# Patient Record
Sex: FEMALE | Race: BLACK OR AFRICAN AMERICAN | Employment: FULL TIME | URBAN - METROPOLITAN AREA
[De-identification: names, ages, dates, MRNs, and addresses within clinical notes are randomized per-mention and may not be internally consistent; named-entity substitution may affect disease eponyms.]

---

## 2017-01-05 ENCOUNTER — ROUTINE PRENATAL (OUTPATIENT)
Dept: OBGYN CLINIC | Age: 32
End: 2017-01-05

## 2017-01-05 VITALS
DIASTOLIC BLOOD PRESSURE: 76 MMHG | SYSTOLIC BLOOD PRESSURE: 124 MMHG | WEIGHT: 249 LBS | BODY MASS INDEX: 41.48 KG/M2 | HEIGHT: 65 IN

## 2017-01-05 DIAGNOSIS — Z34.03 ENCOUNTER FOR SUPERVISION OF NORMAL FIRST PREGNANCY IN THIRD TRIMESTER: Primary | ICD-10-CM

## 2017-01-19 ENCOUNTER — ROUTINE PRENATAL (OUTPATIENT)
Dept: OBGYN CLINIC | Age: 32
End: 2017-01-19

## 2017-01-19 VITALS
WEIGHT: 251 LBS | HEIGHT: 65 IN | DIASTOLIC BLOOD PRESSURE: 80 MMHG | SYSTOLIC BLOOD PRESSURE: 134 MMHG | BODY MASS INDEX: 41.82 KG/M2

## 2017-01-19 DIAGNOSIS — Z01.419 ENCOUNTER FOR GYNECOLOGICAL EXAMINATION WITHOUT ABNORMAL FINDING: Primary | ICD-10-CM

## 2017-01-19 NOTE — PATIENT INSTRUCTIONS
Weeks 30 to 32 of Your Pregnancy: Care Instructions  Your Care Instructions    You have made it to the final months of your pregnancy. By now, your baby is really starting to look like a baby, with hair and plump skin. As you enter the final weeks of pregnancy, the reality of having a baby may start to set in. This is the time to settle on a name, get your household in order, set up a safe nursery, and find quality  if needed. Doing these things in advance will allow you to focus on caring for and enjoying your new baby. You may also want to have a tour of your hospital's labor and delivery unit to get a better idea of what to expect while you are in the hospital.  During these last months, it is very important to take good care of yourself and pay attention to what your body needs. If your doctor says it is okay for you to work, don't push yourself too hard. Use the tips provided in this care sheet to ease heartburn and care for varicose veins. If you haven't already had the Tdap shot during this pregnancy, talk to your doctor about getting it. It will help protect your  against pertussis infection. Follow-up care is a key part of your treatment and safety. Be sure to make and go to all appointments, and call your doctor if you are having problems. It's also a good idea to know your test results and keep a list of the medicines you take. How can you care for yourself at home? Pay attention to your baby's movements  · You should feel your baby move several times every day. · Your baby now turns less, and kicks and jabs more. · Your baby sleeps 20 to 45 minutes at a time and is more active at certain times of day. · If your doctor wants you to count your baby's kicks:  ¨ Empty your bladder, and lie on your side or relax in a comfortable chair. ¨ Write down your start time. ¨ Pay attention only to your baby's movements. Count any movement except hiccups.   ¨ After you have counted 10 movements, write down your stop time. ¨ Write down how many minutes it took for your baby to move 10 times. ¨ If an hour goes by and you have not recorded 10 movements, have something to eat or drink and then count for another hour. If you do not record 10 movements in either hour, call your doctor. Ease heartburn  · Eat small, frequent meals. · Do not eat chocolate, peppermint, or very spicy foods. Avoid drinks with caffeine, such as coffee, tea, and sodas. · Avoid bending over or lying down after meals. · Talk a short walk after you eat. · If heartburn is a problem at night, do not eat for 2 hours before bedtime. · Take antacids like Mylanta, Maalox, Rolaids, or Tums. Do not take antacids that have sodium bicarbonate. Care for varicose veins  · Varicose veins are blood vessels that stretch out with the extra blood during pregnancy. Your legs may ache or throb. Most varicose veins will go away after the birth. · Avoid standing for long periods of time. Sit with your legs crossed at the ankles, not the knees. · Sit with your feet propped up. · Avoid tight clothing or stockings. Wear support hose. · Exercise regularly. Try walking for at least 30 minutes a day. Where can you learn more? Go to http://michael-janelle.info/. Enter D648 in the search box to learn more about \"Weeks 30 to 32 of Your Pregnancy: Care Instructions. \"  Current as of: May 30, 2016  Content Version: 11.1  © 6060-2746 [employer]. Care instructions adapted under license by [employer] (which disclaims liability or warranty for this information). If you have questions about a medical condition or this instruction, always ask your healthcare professional. [place] [address] any warranty or liability for your use of this information.

## 2017-01-24 ENCOUNTER — TELEPHONE (OUTPATIENT)
Dept: OBGYN CLINIC | Age: 32
End: 2017-01-24

## 2017-01-24 NOTE — TELEPHONE ENCOUNTER
It is not unusual to start yenni this early especially in the evenings. If the contractions are every 5 minutes for an hour then would need to call and be seen. If the contractions taper off then she does not need to worry about them. As always, when she starts yenni she should increase her fluids and rest.  The fibroids may or may not impact the pregnancy. Sometimes they can cause more pain throughout the pregnancy. We can further discuss at her next appointment.

## 2017-01-24 NOTE — TELEPHONE ENCOUNTER
33year old patient 32w1d  pregnant patient last seen in the office on 17. Patient calling to complain of having regular contractions last night from 11pm to 1 am. Patient thinks they were every 10 minutes apart. Patient reports some not regular contractions off and on the rest of the night. Patient denies vaginal bleeding, ROM, and reports positive fetal movement. Patient reports a history of fibroids and is wondering how this will affect the latter part of the pregnancy. This nurse advised patient to increase fluids, rest and can take tylenol and to call back if symptoms change. Please advise    ANTOINETTE artis to speak to Annie Feng, who was also on the phone.

## 2017-02-02 ENCOUNTER — ROUTINE PRENATAL (OUTPATIENT)
Dept: OBGYN CLINIC | Age: 32
End: 2017-02-02

## 2017-02-02 VITALS
WEIGHT: 253 LBS | SYSTOLIC BLOOD PRESSURE: 130 MMHG | HEIGHT: 65 IN | DIASTOLIC BLOOD PRESSURE: 80 MMHG | BODY MASS INDEX: 42.15 KG/M2

## 2017-02-02 DIAGNOSIS — Z34.03 ENCOUNTER FOR SUPERVISION OF NORMAL FIRST PREGNANCY IN THIRD TRIMESTER: Primary | ICD-10-CM

## 2017-02-02 NOTE — PROGRESS NOTES
Pt doing well, see prenatal flowsheet. Physician review of ultrasound performed by technician    Today's ultrasound report and images were reviewed and discussed with the patient. Please see images and imaging report entered by technician in PACS for more detail and progress     A SINGLE VERTEX 33W3D IUP IS SEEN. FETAL CARDIAC MOTION OBSERVED. LIMITED ANATOMY WAS VISUALIZED AND APPEARS WNL. APPROPRIATE FETAL GROWTH IS SEEN. SIZE = DATES. ISRAEL AND PLACENTA APPEAR WNL.

## 2017-02-10 ENCOUNTER — ROUTINE PRENATAL (OUTPATIENT)
Dept: OBGYN CLINIC | Age: 32
End: 2017-02-10

## 2017-02-10 VITALS
DIASTOLIC BLOOD PRESSURE: 82 MMHG | WEIGHT: 254 LBS | BODY MASS INDEX: 42.32 KG/M2 | SYSTOLIC BLOOD PRESSURE: 130 MMHG | HEIGHT: 65 IN

## 2017-02-10 DIAGNOSIS — Z34.83 PRENATAL CARE, SUBSEQUENT PREGNANCY, THIRD TRIMESTER: Primary | ICD-10-CM

## 2017-02-10 DIAGNOSIS — Z34.03 ENCOUNTER FOR SUPERVISION OF NORMAL FIRST PREGNANCY IN THIRD TRIMESTER: ICD-10-CM

## 2017-02-10 LAB — GRBS, EXTERNAL: POSITIVE

## 2017-02-14 ENCOUNTER — ROUTINE PRENATAL (OUTPATIENT)
Dept: OBGYN CLINIC | Age: 32
End: 2017-02-14

## 2017-02-14 VITALS
WEIGHT: 255 LBS | HEIGHT: 65 IN | BODY MASS INDEX: 42.49 KG/M2 | DIASTOLIC BLOOD PRESSURE: 80 MMHG | SYSTOLIC BLOOD PRESSURE: 124 MMHG

## 2017-02-14 DIAGNOSIS — Z34.03 ENCOUNTER FOR SUPERVISION OF NORMAL FIRST PREGNANCY IN THIRD TRIMESTER: Primary | ICD-10-CM

## 2017-02-14 NOTE — PATIENT INSTRUCTIONS

## 2017-02-16 LAB
GP B STREP DNA SPEC QL NAA+PROBE: POSITIVE
ORGANISM IDENTIFICATION, 188761: NORMAL

## 2017-02-20 ENCOUNTER — ROUTINE PRENATAL (OUTPATIENT)
Dept: OBGYN CLINIC | Age: 32
End: 2017-02-20

## 2017-02-20 VITALS
SYSTOLIC BLOOD PRESSURE: 124 MMHG | DIASTOLIC BLOOD PRESSURE: 84 MMHG | WEIGHT: 256 LBS | BODY MASS INDEX: 42.65 KG/M2 | HEIGHT: 65 IN

## 2017-02-20 DIAGNOSIS — Z34.03 ENCOUNTER FOR SUPERVISION OF NORMAL FIRST PREGNANCY IN THIRD TRIMESTER: Primary | ICD-10-CM

## 2017-02-27 ENCOUNTER — ROUTINE PRENATAL (OUTPATIENT)
Dept: OBGYN CLINIC | Age: 32
End: 2017-02-27

## 2017-02-27 VITALS — WEIGHT: 260 LBS | BODY MASS INDEX: 43.27 KG/M2 | SYSTOLIC BLOOD PRESSURE: 120 MMHG | DIASTOLIC BLOOD PRESSURE: 74 MMHG

## 2017-02-27 DIAGNOSIS — Z34.03 ENCOUNTER FOR SUPERVISION OF NORMAL FIRST PREGNANCY IN THIRD TRIMESTER: Primary | ICD-10-CM

## 2017-02-27 NOTE — PATIENT INSTRUCTIONS
Preparing for Childbirth: Care Instructions  Your Care Instructions  You are getting close to the birth of your child. Even after these months of taking care of yourself and the baby, you can still take steps that will help you have a healthy labor and birth. You can take classes to help you and your partner or  prepare for the birth. You also can talk with your doctor about what you would like to happen during your labor. In the last 2 months of your pregnancy, your baby becomes too big to move around easily inside the uterus and may seem to move less. At the end of your pregnancy, your baby probably will settle into a head-down position. You will likely feel some pressure in your pelvis as you get close to the birth. You may notice moments when your belly tightens and becomes firm to the touch, then relaxes. These are called [facility] contractions, which sometimes occur as often as every 10 to 20 minutes. These contractions usually stop when you are active. (True labor pains continue or increase if you move around.)  Rupture of your membranes (\"breaking of the water\") often is a sign that labor has started or is about to start. This happens when a hole or tear develops in the fluid-filled bag (amniotic sac) that surrounds and protects your baby. You may feel a huge gush of water or a steady trickle of fluid. Call your doctor or go to the hospital if you think this has happened. Contractions may start, or if you are already having contractions, they may get stronger. Follow-up care is a key part of your treatment and safety. Be sure to make and go to all appointments, and call your doctor if you are having problems. Its also a good idea to know your test results and keep a list of the medicines you take. How can you care for yourself at home? · Get plenty of rest.  · Take childbirth classes with your partner or . You will learn relaxation exercises that are helpful during labor.  You also will learn what you can do to manage labor pain. · If you have other children, take a class to learn how to help them adjust to the new baby. · Develop a written birth plan, if you choose to, keeping in mind that labor is hard to predict and your plan may change after labor begins. Some of what a birth plan may address includes:  ¨ Where you would like to have your baby. This includes the building and the room. It could be the hospital's birthing room, a separate birthing center, or your own home. ¨ Who you would like to assist with delivery of your baby. You may want your doctor, an obstetrician, or a certified nurse-midwife. Some women prefer a lay midwife or a  to provide support before and after delivery. ¨ Whether you want a , nurse, midwife, or  to give you support from early labor until after childbirth. ¨ What comfort measures you want. You may have to choose between walking around during labor or having the security of a heart monitor for your baby. You may want to listen to music during labor. You may know what position you want to be in (such as sitting, squatting, or reclining) for pushing. ¨ What pain relief you would like. There are several choices, so be sure to talk with your doctor about them. ¨ How you want you and your baby to spend the first few hours after birth. ¨ You may want to keep your baby with you for at least 1 hour after birth for bonding and early breastfeeding. ¨ You may want the hospital to delay some infant care steps, such as a vitamin K injection, so that you have calming time with your baby. ¨ You may not want visitors, or you may want other family members there. · Know the early signs of labor, such as a steady ache low in your back. · If you are going to a hospital or clinic to have your baby, have your bag ready to go. ¨ Pack a nightgown, robe, panties, socks, and slippers.  You may want to bring your own soap, shampoo, brush, toothbrush, and toothpaste. Bring your own self-adhesive sanitary pads. ¨ In your baby's bag, bring an outfit, small blanket, and diapers. Have your car seat ready to go. When should you call for help? Call 911 anytime you think you may need emergency care. For example, call if:  · You passed out (lost consciousness). · You have severe vaginal bleeding. · You have severe pain in your belly or pelvis. · You have had fluid gushing or leaking from your vagina and you know or think the umbilical cord is bulging into your vagina. If this happens, immediately get down on your knees so your rear end (buttocks) is higher than your head. This will decrease the pressure on the cord until help arrives. Call your doctor now or seek immediate medical care if:  · You have signs of preeclampsia, such as:  ¨ Sudden swelling of your face, hands, or feet. ¨ New vision problems (such as dimness or blurring). ¨ A severe headache. · You have any vaginal bleeding. · You have belly pain or cramping. · You have a fever. · You have had regular contractions (with or without pain) for an hour. This means that you have 8 or more within 1 hour or 4 or more in 20 minutes after you change your position and drink fluids. · You have a sudden release of fluid from your vagina. · You have low back pain or pelvic pressure that does not go away. · You notice that your baby has stopped moving or is moving much less than normal.  Watch closely for changes in your health, and be sure to contact your doctor if you have any problems. Where can you learn more? Go to http://michael-janelle.info/. Enter C807 in the search box to learn more about \"Preparing for Childbirth: Care Instructions. \"  Current as of: May 30, 2016  Content Version: 11.1  © 0637-6167 [employer]. Care instructions adapted under license by [employer] (which disclaims liability or warranty for this information).  If you have questions about a medical condition or this instruction, always ask your healthcare professional. [place] [address] any warranty or liability for your use of this information.

## 2017-02-27 NOTE — MR AVS SNAPSHOT
Visit Information Date & Time Provider Department Dept. Phone Encounter #  
 2/27/2017  2:50 PM MD Mike García [id number] Your Appointments 2/27/2017  2:50 PM  
OB VISIT with MD Mike García ([address]) Appt Note: u/s (growth) then 1wk fob FW  
 [address] [facility] [address] [place] [address] [phone]  
[facility] [address] [facility] [address] Upcoming Health Maintenance Date Due  
 PAP AKA CERVICAL CYTOLOGY 4/13/2015 INFLUENZA AGE 9 TO ADULT 8/1/2016 Allergies as of 2/27/2017  Review Complete On: 2/20/2017 By: Kasandra Cano MD  
  
 Severity Noted Reaction Type Reactions Aspirin  02/07/2015    Hives Current Immunizations  Never Reviewed Name Date Tdap 12/20/2016 Not reviewed this visit Vitals BP  
  
  
  
  
  
 120/74 BMI and BSA Data Body Mass Index Body Surface Area  
 43.27 kg/m 2 2.33 m 2 Preferred Pharmacy Pharmacy Name Phone Ana [facility] [phone] Your Updated Medication List  
  
   
This list is accurate as of: 2/27/17  2:45 PM.  Always use your most recent med list.  
  
  
  
  
 albuterol 90 mcg/actuation inhaler Commonly known as:  PROVENTIL HFA, VENTOLIN HFA, PROAIR HFA Take 2 Puffs by inhalation every four (4) hours as needed for Wheezing. AMBULATORY BREAST PUMP Use for normal postpartum lactation  
  
 levoFLOXacin 500 mg tablet Commonly known as:  Phi Bailey Take 500 mg by mouth daily. Indications: sinus infection [facility] (28) 0.25-35 mg-mcg Tab Generic drug:  norgestimate-ethinyl estradiol Take 1 Tab by mouth daily. Patient Instructions Preparing for Childbirth: Care Instructions Your Care Instructions You are getting close to the birth of your child. Even after these months of taking care of yourself and the baby, you can still take steps that will help you have a healthy labor and birth. You can take classes to help you and your partner or  prepare for the birth. You also can talk with your doctor about what you would like to happen during your labor. In the last 2 months of your pregnancy, your baby becomes too big to move around easily inside the uterus and may seem to move less. At the end of your pregnancy, your baby probably will settle into a head-down position. You will likely feel some pressure in your pelvis as you get close to the birth. You may notice moments when your belly tightens and becomes firm to the touch, then relaxes. These are called [facility] contractions, which sometimes occur as often as every 10 to 20 minutes. These contractions usually stop when you are active. (True labor pains continue or increase if you move around.) Rupture of your membranes (\"breaking of the water\") often is a sign that labor has started or is about to start. This happens when a hole or tear develops in the fluid-filled bag (amniotic sac) that surrounds and protects your baby. You may feel a huge gush of water or a steady trickle of fluid. Call your doctor or go to the hospital if you think this has happened. Contractions may start, or if you are already having contractions, they may get stronger. Follow-up care is a key part of your treatment and safety. Be sure to make and go to all appointments, and call your doctor if you are having problems. Its also a good idea to know your test results and keep a list of the medicines you take. How can you care for yourself at home? · Get plenty of rest. 
· Take childbirth classes with your partner or . You will learn relaxation exercises that are helpful during labor. You also will learn what you can do to manage labor pain. · If you have other children, take a class to learn how to help them adjust to the new baby. · Develop a written birth plan, if you choose to, keeping in mind that labor is hard to predict and your plan may change after labor begins. Some of what a birth plan may address includes: ¨ Where you would like to have your baby. This includes the building and the room. It could be the hospital's birthing room, a separate birthing center, or your own home. ¨ Who you would like to assist with delivery of your baby. You may want your doctor, an obstetrician, or a certified nurse-midwife. Some women prefer a lay midwife or a  to provide support before and after delivery. ¨ Whether you want a , nurse, midwife, or  to give you support from early labor until after childbirth. ¨ What comfort measures you want. You may have to choose between walking around during labor or having the security of a heart monitor for your baby. You may want to listen to music during labor. You may know what position you want to be in (such as sitting, squatting, or reclining) for pushing. ¨ What pain relief you would like. There are several choices, so be sure to talk with your doctor about them. ¨ How you want you and your baby to spend the first few hours after birth. ¨ You may want to keep your baby with you for at least 1 hour after birth for bonding and early breastfeeding. ¨ You may want the hospital to delay some infant care steps, such as a vitamin K injection, so that you have calming time with your baby. ¨ You may not want visitors, or you may want other family members there. · Know the early signs of labor, such as a steady ache low in your back. · If you are going to a hospital or clinic to have your baby, have your bag ready to go. ¨ Pack a nightgown, robe, panties, socks, and slippers. You may want to bring your own soap, shampoo, brush, toothbrush, and toothpaste.  Bring your own self-adhesive sanitary pads. ¨ In your baby's bag, bring an outfit, small blanket, and diapers. Have your car seat ready to go. When should you call for help? Call 911 anytime you think you may need emergency care. For example, call if: 
· You passed out (lost consciousness). · You have severe vaginal bleeding. · You have severe pain in your belly or pelvis. · You have had fluid gushing or leaking from your vagina and you know or think the umbilical cord is bulging into your vagina. If this happens, immediately get down on your knees so your rear end (buttocks) is higher than your head. This will decrease the pressure on the cord until help arrives. Call your doctor now or seek immediate medical care if: 
· You have signs of preeclampsia, such as: 
¨ Sudden swelling of your face, hands, or feet. ¨ New vision problems (such as dimness or blurring). ¨ A severe headache. · You have any vaginal bleeding. · You have belly pain or cramping. · You have a fever. · You have had regular contractions (with or without pain) for an hour. This means that you have 8 or more within 1 hour or 4 or more in 20 minutes after you change your position and drink fluids. · You have a sudden release of fluid from your vagina. · You have low back pain or pelvic pressure that does not go away. · You notice that your baby has stopped moving or is moving much less than normal. 
Watch closely for changes in your health, and be sure to contact your doctor if you have any problems. Where can you learn more? Go to http://michael-janelle.info/. Enter C937 in the search box to learn more about \"Preparing for Childbirth: Care Instructions. \" Current as of: May 30, 2016 Content Version: 11.1 © 8658-8955 [employer]. Care instructions adapted under license by [employer] (which disclaims liability or warranty for this information).  If you have questions about a medical condition or this instruction, always ask your healthcare professional. [address] any warranty or liability for your use of this information. Introducing [facility] & HEALTH SERVICES! Dear Pam Gan: Thank you for requesting a [employer] account. Our records indicate that you already have an active [employer] account. You can access your account anytime at [email or web address] Did you know that you can access your hospital and ER discharge instructions at any time in [employer]? You can also review all of your test results from your hospital stay or ER visit. Additional Information If you have questions, please visit the Frequently Asked Questions section of the [employer] website at [email or web address] Remember, [employer] is NOT to be used for urgent needs. For medical emergencies, dial 911. Now available from your iPhone and Android! Please provide this summary of care documentation to your next provider. Your primary care clinician is listed as Eh Allen. If you have any questions after today's visit, please call [phone].

## 2017-03-01 ENCOUNTER — PATIENT MESSAGE (OUTPATIENT)
Dept: OBGYN CLINIC | Age: 32
End: 2017-03-01

## 2017-03-02 RX ORDER — VALACYCLOVIR HYDROCHLORIDE 1 G/1
1000 TABLET, FILM COATED ORAL DAILY
Qty: 30 TAB | Refills: 1 | Status: SHIPPED | OUTPATIENT
Start: 2017-03-02 | End: 2021-06-04

## 2017-03-06 ENCOUNTER — ROUTINE PRENATAL (OUTPATIENT)
Dept: OBGYN CLINIC | Age: 32
End: 2017-03-06

## 2017-03-06 VITALS — DIASTOLIC BLOOD PRESSURE: 80 MMHG | SYSTOLIC BLOOD PRESSURE: 124 MMHG | BODY MASS INDEX: 43.77 KG/M2 | WEIGHT: 263 LBS

## 2017-03-06 DIAGNOSIS — Z34.03 ENCOUNTER FOR SUPERVISION OF NORMAL FIRST PREGNANCY IN THIRD TRIMESTER: Primary | ICD-10-CM

## 2017-03-06 NOTE — MR AVS SNAPSHOT
Visit Information Date & Time Provider Department Dept. Phone Encounter #  
 3/6/2017  2:20 PM Madhu Vu MD Greig [facility] [phone] [id number] Upcoming Health Maintenance Date Due  
 PAP AKA CERVICAL CYTOLOGY 4/13/2015 INFLUENZA AGE 9 TO ADULT 8/1/2016 Allergies as of 3/6/2017  Review Complete On: 2/27/2017 By: Madhu Vu MD  
  
 Severity Noted Reaction Type Reactions Aspirin  02/07/2015    Hives Current Immunizations  Never Reviewed Name Date Tdap 12/20/2016 Not reviewed this visit Vitals BP Weight(growth percentile) LMP BMI OB Status Smoking Status 124/80 263 lb (119.3 kg) 06/13/2016 (Exact Date) 43.77 kg/m2 Pregnant Never Smoker BMI and BSA Data Body Mass Index Body Surface Area 43.77 kg/m 2 2.34 m 2 Preferred Pharmacy Pharmacy Name Phone Ana Laura[address] [phone] Your Updated Medication List  
  
   
This list is accurate as of: 3/6/17  2:32 PM.  Always use your most recent med list.  
  
  
  
  
 albuterol 90 mcg/actuation inhaler Commonly known as:  PROVENTIL HFA, VENTOLIN HFA, PROAIR HFA Take 2 Puffs by inhalation every four (4) hours as needed for Wheezing. AMBULATORY BREAST PUMP Use for normal postpartum lactation  
  
 levoFLOXacin 500 mg tablet Commonly known as:  Arlyss Benitez Take 500 mg by mouth daily. Indications: sinus infection [facility] (50) 0.25-35 mg-mcg Tab Generic drug:  norgestimate-ethinyl estradiol Take 1 Tab by mouth daily. valACYclovir 1 gram tablet Commonly known as:  VALTREX Take 1 Tab by mouth daily. Introducing [facility] & HEALTH SERVICES! Dear Wilbert Nicholson: Thank you for requesting a [employer] account. Our records indicate that you already have an active [employer] account.   You can access your account anytime at [email or web address] Did you know that you can access your hospital and ER discharge instructions at any time in [employer]? You can also review all of your test results from your hospital stay or ER visit. Additional Information If you have questions, please visit the Frequently Asked Questions section of the [employer] website at [email or web address] Remember, [employer] is NOT to be used for urgent needs. For medical emergencies, dial 911. Now available from your iPhone and Android! Please provide this summary of care documentation to your next provider. Your primary care clinician is listed as Dayna Leslie. If you have any questions after today's visit, please call [phone].

## 2017-03-10 ENCOUNTER — ROUTINE PRENATAL (OUTPATIENT)
Dept: OBGYN CLINIC | Age: 32
End: 2017-03-10

## 2017-03-10 VITALS — BODY MASS INDEX: 43.77 KG/M2 | WEIGHT: 263 LBS | SYSTOLIC BLOOD PRESSURE: 126 MMHG | DIASTOLIC BLOOD PRESSURE: 78 MMHG

## 2017-03-10 DIAGNOSIS — N89.8 VAGINAL DISCHARGE DURING PREGNANCY IN THIRD TRIMESTER: Primary | ICD-10-CM

## 2017-03-10 DIAGNOSIS — O26.893 VAGINAL DISCHARGE DURING PREGNANCY IN THIRD TRIMESTER: Primary | ICD-10-CM

## 2017-03-10 DIAGNOSIS — Z34.03 ENCOUNTER FOR SUPERVISION OF NORMAL FIRST PREGNANCY IN THIRD TRIMESTER: ICD-10-CM

## 2017-03-10 NOTE — MR AVS SNAPSHOT
Visit Information Date & Time Provider Department Dept. Phone Encounter #  
 3/10/2017  9:00 AM Kasandra Cano MD [employer] [phone] Your Appointments 3/13/2017  2:20 PM  
OB VISIT with Kasandra Cano MD  
[employer] ([facility]) Appt Note: 1wk fob  
 [address] [facility] [address] [facility] Gigi Alvarez [address]  
[facility] [address] [place] [address] Upcoming Health Maintenance Date Due  
 PAP AKA CERVICAL CYTOLOGY 4/13/2015 INFLUENZA AGE 9 TO ADULT 8/1/2016 Allergies as of 3/10/2017  Review Complete On: 3/10/2017 By: Nestor Benjamin Severity Noted Reaction Type Reactions Aspirin  02/07/2015    Hives Current Immunizations  Never Reviewed Name Date Tdap 12/20/2016 Not reviewed this visit Vitals BP Weight(growth percentile) LMP BMI OB Status Smoking Status 126/78 263 lb (119.3 kg) 06/13/2016 (Exact Date) 43.77 kg/m2 Pregnant Never Smoker BMI and BSA Data Body Mass Index Body Surface Area 43.77 kg/m 2 2.34 m 2 Preferred Pharmacy Pharmacy Name Phone Lynda Gardner [phone] Your Updated Medication List  
  
   
This list is accurate as of: 3/10/17  9:24 AM.  Always use your most recent med list.  
  
  
  
  
 albuterol 90 mcg/actuation inhaler Commonly known as:  PROVENTIL HFA, VENTOLIN HFA, PROAIR HFA Take 2 Puffs by inhalation every four (4) hours as needed for Wheezing. AMBULATORY BREAST PUMP Use for normal postpartum lactation  
  
 levoFLOXacin 500 mg tablet Commonly known as:  Phi Bailey Take 500 mg by mouth daily. Indications: sinus infection [address] [facility] (28) 0.25-35 mg-mcg Tab Generic drug:  norgestimate-ethinyl estradiol Take 1 Tab by mouth daily. valACYclovir 1 gram tablet Commonly known as:  VALTREX Take 1 Tab by mouth daily. Introducing [facility]! Dear Garry Jaimes: Thank you for requesting a [employer] account. Our records indicate that you already have an active [employer] account. You can access your account anytime at [email or web address] Did you know that you can access your hospital and ER discharge instructions at any time in [employer]? You can also review all of your test results from your hospital stay or ER visit. Additional Information If you have questions, please visit the Frequently Asked Questions section of the [employer] website at [email or web address] Remember, [employer] is NOT to be used for urgent needs. For medical emergencies, dial 911. Now available from your iPhone and Android! Please provide this summary of care documentation to your next provider. Your primary care clinician is listed as Jigar Larkin. If you have any questions after today's visit, please call [phone].

## 2017-03-10 NOTE — PROGRESS NOTES
C/O cramping that was constant and now is coming and going. Cervix still closed. Feeling good fetal movement. No evidence of active labor. Routine precautions discussed. Also reports vaginal discharge.  + Discharge on exam.  Cervix 20/cl/-3/V. Will send nuswab.

## 2017-03-14 ENCOUNTER — ANESTHESIA (OUTPATIENT)
Dept: SURGERY | Age: 32
End: 2017-03-14
Payer: COMMERCIAL

## 2017-03-14 ENCOUNTER — HOSPITAL ENCOUNTER (INPATIENT)
Age: 32
LOS: 3 days | Discharge: HOME | End: 2017-03-17
Attending: OBSTETRICS & GYNECOLOGY | Admitting: OBSTETRICS & GYNECOLOGY
Payer: COMMERCIAL

## 2017-03-14 ENCOUNTER — ANESTHESIA EVENT (OUTPATIENT)
Dept: SURGERY | Age: 32
End: 2017-03-14
Payer: COMMERCIAL

## 2017-03-14 PROBLEM — Z34.90 PREGNANCY: Status: ACTIVE | Noted: 2017-03-14

## 2017-03-14 LAB
A VAGINAE DNA VAG QL NAA+PROBE: NORMAL SCORE
ALBUMIN SERPL BCP-MCNC: 2.5 G/DL (ref 3.5–5)
ALBUMIN/GLOB SERPL: 0.7 {RATIO} (ref 1.1–2.2)
ALP SERPL-CCNC: 96 U/L (ref 45–117)
ALT SERPL-CCNC: 21 U/L (ref 12–78)
ANION GAP BLD CALC-SCNC: 12 MMOL/L (ref 5–15)
AST SERPL W P-5'-P-CCNC: 15 U/L (ref 15–37)
BASOPHILS # BLD AUTO: 0 K/UL (ref 0–0.1)
BASOPHILS # BLD: 0 % (ref 0–1)
BILIRUB SERPL-MCNC: 0.3 MG/DL (ref 0.2–1)
BUN SERPL-MCNC: 4 MG/DL (ref 6–20)
BUN/CREAT SERPL: 9 (ref 12–20)
BVAB2 DNA VAG QL NAA+PROBE: NORMAL SCORE
C ALBICANS DNA VAG QL NAA+PROBE: NEGATIVE
C GLABRATA DNA VAG QL NAA+PROBE: NEGATIVE
CALCIUM SERPL-MCNC: 8.9 MG/DL (ref 8.5–10.1)
CHLORIDE SERPL-SCNC: 104 MMOL/L (ref 97–108)
CO2 SERPL-SCNC: 23 MMOL/L (ref 21–32)
CREAT SERPL-MCNC: 0.47 MG/DL (ref 0.55–1.02)
CREAT UR-MCNC: 44.69 MG/DL
EOSINOPHIL # BLD: 0.1 K/UL (ref 0–0.4)
EOSINOPHIL NFR BLD: 1 % (ref 0–7)
ERYTHROCYTE [DISTWIDTH] IN BLOOD BY AUTOMATED COUNT: 15.6 % (ref 11.5–14.5)
GLOBULIN SER CALC-MCNC: 3.8 G/DL (ref 2–4)
GLUCOSE SERPL-MCNC: 107 MG/DL (ref 65–100)
HCT VFR BLD AUTO: 37.7 % (ref 35–47)
HGB BLD-MCNC: 11.8 G/DL (ref 11.5–16)
LYMPHOCYTES # BLD AUTO: 13 % (ref 12–49)
LYMPHOCYTES # BLD: 1.4 K/UL (ref 0.8–3.5)
MCH RBC QN AUTO: 27.3 PG (ref 26–34)
MCHC RBC AUTO-ENTMCNC: 31.3 G/DL (ref 30–36.5)
MCV RBC AUTO: 87.1 FL (ref 80–99)
MEGA1 DNA VAG QL NAA+PROBE: NORMAL SCORE
MONOCYTES # BLD: 0.5 K/UL (ref 0–1)
MONOCYTES NFR BLD AUTO: 5 % (ref 5–13)
NEUTS SEG # BLD: 8.5 K/UL (ref 1.8–8)
NEUTS SEG NFR BLD AUTO: 81 % (ref 32–75)
PLATELET # BLD AUTO: 216 K/UL (ref 150–400)
POTASSIUM SERPL-SCNC: 3.8 MMOL/L (ref 3.5–5.1)
PROT SERPL-MCNC: 6.3 G/DL (ref 6.4–8.2)
PROT UR-MCNC: 13 MG/DL (ref 0–11.9)
PROT/CREAT UR-RTO: 0.3
RBC # BLD AUTO: 4.33 M/UL (ref 3.8–5.2)
SODIUM SERPL-SCNC: 139 MMOL/L (ref 136–145)
WBC # BLD AUTO: 10.4 K/UL (ref 3.6–11)

## 2017-03-14 PROCEDURE — 75410000002 HC LABOR FEE PER 1 HR: Performed by: OBSTETRICS & GYNECOLOGY

## 2017-03-14 PROCEDURE — 74011250636 HC RX REV CODE- 250/636: Performed by: ANESTHESIOLOGY

## 2017-03-14 PROCEDURE — 59025 FETAL NON-STRESS TEST: CPT | Performed by: OBSTETRICS & GYNECOLOGY

## 2017-03-14 PROCEDURE — 80053 COMPREHEN METABOLIC PANEL: CPT | Performed by: OBSTETRICS & GYNECOLOGY

## 2017-03-14 PROCEDURE — 74011250636 HC RX REV CODE- 250/636

## 2017-03-14 PROCEDURE — 36415 COLL VENOUS BLD VENIPUNCTURE: CPT | Performed by: OBSTETRICS & GYNECOLOGY

## 2017-03-14 PROCEDURE — 96360 HYDRATION IV INFUSION INIT: CPT | Performed by: OBSTETRICS & GYNECOLOGY

## 2017-03-14 PROCEDURE — 99284 EMERGENCY DEPT VISIT MOD MDM: CPT | Performed by: OBSTETRICS & GYNECOLOGY

## 2017-03-14 PROCEDURE — 74011000250 HC RX REV CODE- 250: Performed by: OBSTETRICS & GYNECOLOGY

## 2017-03-14 PROCEDURE — 74011000250 HC RX REV CODE- 250

## 2017-03-14 PROCEDURE — 59200 INSERT CERVICAL DILATOR: CPT | Performed by: OBSTETRICS & GYNECOLOGY

## 2017-03-14 PROCEDURE — 84156 ASSAY OF PROTEIN URINE: CPT | Performed by: OBSTETRICS & GYNECOLOGY

## 2017-03-14 PROCEDURE — 74011250637 HC RX REV CODE- 250/637: Performed by: OBSTETRICS & GYNECOLOGY

## 2017-03-14 PROCEDURE — 96361 HYDRATE IV INFUSION ADD-ON: CPT | Performed by: OBSTETRICS & GYNECOLOGY

## 2017-03-14 PROCEDURE — 74011250636 HC RX REV CODE- 250/636: Performed by: OBSTETRICS & GYNECOLOGY

## 2017-03-14 PROCEDURE — 65270000029 HC RM PRIVATE

## 2017-03-14 PROCEDURE — 74011000250 HC RX REV CODE- 250: Performed by: ANESTHESIOLOGY

## 2017-03-14 PROCEDURE — 3E0R3CZ INTRODUCE REGIONAL ANESTH IN SPINAL CANAL, PERC: ICD-10-PCS | Performed by: ANESTHESIOLOGY

## 2017-03-14 PROCEDURE — 74011000258 HC RX REV CODE- 258: Performed by: OBSTETRICS & GYNECOLOGY

## 2017-03-14 PROCEDURE — 77010026065 HC OXYGEN MINIMUM MEDICAL AIR: Performed by: OBSTETRICS & GYNECOLOGY

## 2017-03-14 PROCEDURE — 00HU33Z INSERTION OF INFUSION DEVICE INTO SPINAL CANAL, PERCUTANEOUS APPROACH: ICD-10-PCS | Performed by: ANESTHESIOLOGY

## 2017-03-14 PROCEDURE — 85025 COMPLETE CBC W/AUTO DIFF WBC: CPT | Performed by: OBSTETRICS & GYNECOLOGY

## 2017-03-14 PROCEDURE — 99218 HC RM OBSERVATION: CPT

## 2017-03-14 PROCEDURE — 77030014125 HC TY EPDRL BBMI -B: Performed by: ANESTHESIOLOGY

## 2017-03-14 RX ORDER — LIDOCAINE HYDROCHLORIDE AND EPINEPHRINE 20; 5 MG/ML; UG/ML
INJECTION, SOLUTION EPIDURAL; INFILTRATION; INTRACAUDAL; PERINEURAL AS NEEDED
Status: DISCONTINUED | OUTPATIENT
Start: 2017-03-14 | End: 2017-03-15 | Stop reason: HOSPADM

## 2017-03-14 RX ORDER — OXYTOCIN IN 5 % DEXTROSE 30/500 ML
1-25 PLASTIC BAG, INJECTION (ML) INTRAVENOUS
Status: DISCONTINUED | OUTPATIENT
Start: 2017-03-15 | End: 2017-03-14

## 2017-03-14 RX ORDER — SODIUM CHLORIDE 0.9 % (FLUSH) 0.9 %
5-10 SYRINGE (ML) INJECTION EVERY 8 HOURS
Status: DISCONTINUED | OUTPATIENT
Start: 2017-03-14 | End: 2017-03-15

## 2017-03-14 RX ORDER — BUTORPHANOL TARTRATE 2 MG/ML
2 INJECTION INTRAMUSCULAR; INTRAVENOUS ONCE
Status: COMPLETED | OUTPATIENT
Start: 2017-03-14 | End: 2017-03-14

## 2017-03-14 RX ORDER — OXYTOCIN IN 5 % DEXTROSE 30/500 ML
1-25 PLASTIC BAG, INJECTION (ML) INTRAVENOUS
Status: DISCONTINUED | OUTPATIENT
Start: 2017-03-15 | End: 2017-03-15

## 2017-03-14 RX ORDER — ACETAMINOPHEN 325 MG/1
650 TABLET ORAL
Status: DISCONTINUED | OUTPATIENT
Start: 2017-03-14 | End: 2017-03-15

## 2017-03-14 RX ORDER — FENTANYL/BUPIVACAINE/NS/PF 2-1250MCG
1-16 PREFILLED PUMP RESERVOIR EPIDURAL CONTINUOUS
Status: DISCONTINUED | OUTPATIENT
Start: 2017-03-14 | End: 2017-03-15 | Stop reason: HOSPADM

## 2017-03-14 RX ORDER — TERBUTALINE SULFATE 1 MG/ML
INJECTION SUBCUTANEOUS
Status: COMPLETED
Start: 2017-03-14 | End: 2017-03-14

## 2017-03-14 RX ORDER — SODIUM CHLORIDE 0.9 % (FLUSH) 0.9 %
5-10 SYRINGE (ML) INJECTION AS NEEDED
Status: DISCONTINUED | OUTPATIENT
Start: 2017-03-14 | End: 2017-03-15

## 2017-03-14 RX ORDER — SODIUM CHLORIDE, SODIUM LACTATE, POTASSIUM CHLORIDE, CALCIUM CHLORIDE 600; 310; 30; 20 MG/100ML; MG/100ML; MG/100ML; MG/100ML
125 INJECTION, SOLUTION INTRAVENOUS CONTINUOUS
Status: DISCONTINUED | OUTPATIENT
Start: 2017-03-14 | End: 2017-03-15

## 2017-03-14 RX ORDER — TERBUTALINE SULFATE 1 MG/ML
0.25 INJECTION SUBCUTANEOUS
Status: COMPLETED | OUTPATIENT
Start: 2017-03-14 | End: 2017-03-14

## 2017-03-14 RX ORDER — ZOLPIDEM TARTRATE 5 MG/1
5 TABLET ORAL
Status: DISCONTINUED | OUTPATIENT
Start: 2017-03-14 | End: 2017-03-15

## 2017-03-14 RX ADMIN — TERBUTALINE SULFATE 0.25 MG: 1 INJECTION SUBCUTANEOUS at 21:44

## 2017-03-14 RX ADMIN — SODIUM CHLORIDE, SODIUM LACTATE, POTASSIUM CHLORIDE, AND CALCIUM CHLORIDE 999 ML/HR: 600; 310; 30; 20 INJECTION, SOLUTION INTRAVENOUS at 06:17

## 2017-03-14 RX ADMIN — EPHEDRINE SULFATE 10 MG: 50 INJECTION INTRAMUSCULAR; INTRAVENOUS; SUBCUTANEOUS at 21:22

## 2017-03-14 RX ADMIN — BUTORPHANOL TARTRATE 2 MG: 2 INJECTION, SOLUTION INTRAMUSCULAR; INTRAVENOUS at 17:35

## 2017-03-14 RX ADMIN — EPHEDRINE SULFATE 10 MG: 50 INJECTION INTRAMUSCULAR; INTRAVENOUS; SUBCUTANEOUS at 21:49

## 2017-03-14 RX ADMIN — SODIUM CHLORIDE, SODIUM LACTATE, POTASSIUM CHLORIDE, AND CALCIUM CHLORIDE 1000 ML: 600; 310; 30; 20 INJECTION, SOLUTION INTRAVENOUS at 05:01

## 2017-03-14 RX ADMIN — EPHEDRINE SULFATE 10 MG: 50 INJECTION INTRAMUSCULAR; INTRAVENOUS; SUBCUTANEOUS at 21:33

## 2017-03-14 RX ADMIN — SODIUM CHLORIDE, SODIUM LACTATE, POTASSIUM CHLORIDE, AND CALCIUM CHLORIDE 125 ML/HR: 600; 310; 30; 20 INJECTION, SOLUTION INTRAVENOUS at 17:38

## 2017-03-14 RX ADMIN — FENTANYL 0.2 MG/100ML-BUPIV 0.125%-NACL 0.9% EPIDURAL INJ 10 ML/HR: 2/0.125 SOLUTION at 23:23

## 2017-03-14 RX ADMIN — LIDOCAINE HYDROCHLORIDE AND EPINEPHRINE 10 ML: 20; 5 INJECTION, SOLUTION EPIDURAL; INFILTRATION; INTRACAUDAL; PERINEURAL at 21:12

## 2017-03-14 RX ADMIN — ACETAMINOPHEN 650 MG: 325 TABLET ORAL at 15:35

## 2017-03-14 RX ADMIN — PROMETHAZINE HYDROCHLORIDE 12.5 MG: 25 INJECTION INTRAMUSCULAR; INTRAVENOUS at 17:35

## 2017-03-14 RX ADMIN — SODIUM CHLORIDE, SODIUM LACTATE, POTASSIUM CHLORIDE, AND CALCIUM CHLORIDE 999 ML/HR: 600; 310; 30; 20 INJECTION, SOLUTION INTRAVENOUS at 21:52

## 2017-03-14 RX ADMIN — DINOPROSTONE 10 MG: 10 INSERT VAGINAL at 11:23

## 2017-03-14 NOTE — PROGRESS NOTES
Ante Partum Progress Note    Manuela Liu  39w1d        Pt presented complaining of painful uterine contractions. No h/a, blurred vision, sob, cp. Vitals:  Visit Vitals    /67    Pulse 96    Temp 98.2 °F (36.8 °C)    Resp 16    Ht 5' 5\" (1.651 m)    Wt 263 lb (119.3 kg)    LMP 2016 (Exact Date)    SpO2 99%    BMI 43.77 kg/m2     Temp (24hrs), Av.2 °F (36.8 °C), Min:98.2 °F (36.8 °C), Max:98.2 °F (36.8 °C)      Non stress test:  Moderate variability, 2 minute deceleration with spontaneous return to baseline, + accels    Uterine Activity: Irregular     Exam:  Patient without distress. Abdomen, fundus soft non-tender     Extremities, no redness or tenderness               Additional Exam: Patient without distress, Abdomen soft, non-tender, Dilation: 0 cm, Effacement: Long  Not Checked and Station: -3    Labs:     Lab Results   Component Value Date/Time    WBC 11.0 2016 02:09 PM    WBC 7.9 2016 09:12 AM    HGB 11.5 2016 02:09 PM    HGB 12.5 2016 09:12 AM    HCT 35.0 2016 02:09 PM    HCT 39.1 2016 09:12 AM    PLATELET 319  02:09 PM    PLATELET 732  09:12 AM    Hgb, External 11.5 2016    Hgb, External 12.5 2016    Hct, External 35.0 2016    Hct, External 39.1 2016    Platelet cnt., External 258 2016    Platelet cnt., External 287 2016       No results found for this or any previous visit (from the past 24 hour(s)). Assessment/Plan: 39w1d   No evidence of active labor, however pt has had 4 out 5 BP's that were elevated and a nonreactive NST at 39 weeks. Will check PIH labs and induce for GHTN at 39 weeks. Discussed risk of c/s due to unfavorable cervix. Will start with cervidil for cervical ripening.

## 2017-03-14 NOTE — PROGRESS NOTES
03/14/17 11:59 AM  CM met with patient to complete initial assessment and to begin discharge planning. Patient demographics reviewed and confirmed. 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 Patient's /FOB Campos Henderson [phone]) works, but will not have any time away from work. There is a support system of family and friends, including patient's mother Matt Henderson [phone]). Patient plans to breastfeed and has a pump to use. [facility] will provide medical follow up for the baby. Patient has car seat, crib, clothing, and other necessary supplies. Patient denied need for [facility] and Medicaid services. Care Management Interventions  PCP Verified by CM:  Yes  Transition of Care Consult (CM Consult): Discharge Planning  Current Support Network: Lives with Spouse  Confirm Follow Up Transport: Family  Plan discussed with Pt/Family/Caregiver: Yes  Discharge Location  Discharge Placement: [address] [place] [address]

## 2017-03-14 NOTE — H&P
History & Physical    Name: Salazar Junior MRN: [id number]  SSN: [id number]    YOB: 1985  Age: 33 y.o. Sex: female        Subjective:     Estimated Date of Delivery: 3/20/17  OB History      Para Term  AB TAB SAB Ectopic Multiple Living    2    1 1              Ms. Shanice Whipple is admitted with pregnancy at 39w1d for uterine contractins. Prenatal course was normal. Please see prenatal records for details. Past Medical History:   Diagnosis Date    Abnormal Papanicolaou smear of cervix 2004    Asthma     has not used inhaler years    Bronchitis     DIONI III (cervical intraepithelial neoplasia grade III) with severe dysplasia     Cold sore     history of oral cold sores     Past Surgical History:   Procedure Laterality Date    HX GYN       HX LEEP PROCEDURE   DIONI III     Social History     Occupational History    Not on file. Social History Main Topics    Smoking status: Never Smoker    Smokeless tobacco: Never Used    Alcohol use No    Drug use: No    Sexual activity: Yes     Partners: Male     Birth control/ protection: Condom     Family History   Problem Relation Age of Onset    No Known Problems Mother        Allergies   Allergen Reactions    Aspirin Hives     Prior to Admission medications    Medication Sig Start Date End Date Taking? Authorizing Provider   PNV no.24-iron-folic acid-dha (PRENATAL DHA+COMPLETE PRENATAL) [id number] mg-mcg-mg cmpk Take  by mouth. Yes Historical Provider   valACYclovir (VALTREX) 1 gram tablet Take 1 Tab by mouth daily. 3/2/17  Yes Jerzy Anguiano MD   AMBULATORY BREAST PUMP Use for normal postpartum lactation 10/18/16   Jerzy Anguiano MD   levofloxacin (LEVAQUIN) 500 mg tablet Take 500 mg by mouth daily. Indications: sinus infection    Miguelangel Paz MD   albuterol (PROVENTIL HFA, VENTOLIN HFA, PROAIR HFA) 90 mcg/actuation inhaler Take 2 Puffs by inhalation every four (4) hours as needed for Wheezing.     Miguelangel Paz MD norgestimate-ethinyl estradiol ([address] [facility], ,) 0.25-35 mg-mcg per tablet Take 1 Tab by mouth daily. Miguelangel Paz MD        Review of Systems: A comprehensive review of systems was negative except for that written in the HPI. Objective:     Vitals:  Vitals:    03/14/17 0603 03/14/17 0608 03/14/17 0621 03/14/17 0626   BP:       Pulse:       Resp:       Temp:       SpO2: 98% 96% 99% 99%   Weight:       Height:            Physical Exam:  Heart: Regular rate and rhythm  Lung: clear to auscultation throughout lung fields, no wheezes, no rales, no rhonchi and normal respiratory effort  Abdomen: soft, nontender  Cervical Exam: 1/30/-2  Lower Extremities:  - Edema No  Membranes:  Intact  Fetal Heart Rate: Baseline: 1303-140s per minute    Prenatal Labs:   Lab Results   Component Value Date/Time    Rubella, External Immune 07/25/2016    GrBStrep, External Positive 02/10/2017    HBsAg, External Negative 07/25/2016    HIV, External Negative 07/25/2016    RPR, External Negative 07/25/2016    Gonorrhea, External Negative 07/25/2016    Chlamydia, External Negative 07/25/2016        Assessment/Plan:     Plan: Admit for observation. IV fluids. Group B Strep was positive, use of prophylactic antibiotics not indicated.     Signed By:  Paul Farfan MD     March 14, 2017

## 2017-03-14 NOTE — IP AVS SNAPSHOT
Marialuisa Morel 
 
 
 [address] [facility] 
[phone] Patient: Vivian West MRN: [id number] SJQ:[birth year] You are allergic to the following Allergen Reactions Aspirin Hives Recent Documentation Height Weight Breastfeeding? BMI OB Status Smoking Status 1.651 m 119.3 kg Unknown 43.77 kg/m2 Recent pregnancy Never Smoker Unresulted Labs Order Current Status SAMPLE TO BLOOD BANK In process Emergency Contacts Name Discharge Info Relation Home Work Mobile Dwayne Robles DISCHARGE CAREGIVER [3] Spouse [3] [phone] Annabella Weiss  Parent [1] [phone] About your hospitalization You were admitted on:  March 14, 2017 You last received care in the:  [facility] You were discharged on:  March 17, 2017 Unit phone number:  [phone] Why you were hospitalized Your primary diagnosis was:  Not on File Your diagnoses also included:  Pregnancy Providers Seen During Your Hospitalizations Provider Role Specialty Primary office phone Jak Jain MD Attending Provider Obstetrics & Gynecology [phone] Your Primary Care Physician (PCP) Primary Care Physician Office Phone Office Fax Dairl Breath [phone] Follow-up Information Follow up With Details Comments Contact Info Erika Alaniz MD   [address] [place] [address],[address] [facility] [phone]. [phone] Jak Jain MD In 6 weeks  [address] [facility] [address] [facility] 
[phone] Current Discharge Medication List  
  
START taking these medications Dose & Instructions Dispensing Information Comments Morning Noon Evening Bedtime  
 oxyCODONE-acetaminophen 5-325 mg per tablet Commonly known as:  PERCOCET Your last dose was: Your next dose is: Dose:  1 Tab Take 1 Tab by mouth every four (4) hours as needed. Max Daily Amount: 6 Tabs. Quantity:  30 Tab Refills:  0 CONTINUE these medications which have NOT CHANGED Dose & Instructions Dispensing Information Comments Morning Noon Evening Bedtime PRENATAL DHA+COMPLETE PRENATAL -300 mg-mcg-mg Cmpk Generic drug:  PNV no.24-iron-folic acid-dha Your last dose was: Your next dose is: Take  by mouth. Refills:  0 ASK your doctor about these medications Dose & Instructions Dispensing Information Comments Morning Noon Evening Bedtime  
 albuterol 90 mcg/actuation inhaler Commonly known as:  PROVENTIL HFA, VENTOLIN HFA, PROAIR HFA Your last dose was: Your next dose is:    
   
   
 Dose:  2 Puff Take 2 Puffs by inhalation every four (4) hours as needed for Wheezing. Refills:  0 AMBULATORY BREAST PUMP Your last dose was: Your next dose is:    
   
   
 Use for normal postpartum lactation Quantity:  1 Device Refills:  0  
     
   
   
   
  
 levoFLOXacin 500 mg tablet Commonly known as:  Kenard Adrian Your last dose was: Your next dose is:    
   
   
 Dose:  500 mg Take 500 mg by mouth daily. Indications: sinus infection Refills:  0 [facility] [facility] (28) 0.25-35 mg-mcg Tab Generic drug:  norgestimate-ethinyl estradiol Your last dose was: Your next dose is:    
   
   
 Dose:  1 Tab Take 1 Tab by mouth daily. Refills:  0  
     
   
   
   
  
 valACYclovir 1 gram tablet Commonly known as:  VALTREX Your last dose was: Your next dose is:    
   
   
 Dose:  1000 mg Take 1 Tab by mouth daily. Quantity:  30 Tab Refills:  1 Where to Get Your Medications Information on where to get these meds will be given to you by the nurse or doctor. ! Ask your nurse or doctor about these medications  
  oxyCODONE-acetaminophen 5-325 mg per tablet Discharge Instructions POST DELIVERY DISCHARGE INSTRUCTIONS Name: Celestino Kirkland YOB: 1985 Primary Diagnosis: Active Problems: 
  Pregnancy (3/14/2017) General:  
 
Diet/Diet Restrictions: 
Eight 8-ounce glasses of fluid daily (water, juices); avoid excessive caffeine intake. Meals/snacks as desired which are high in fiber and carbohydrates and low in fat and cholesterol. Physical Activity / Restrictions / Safety:  
 
Avoid heavy lifting, no more that 8 lbs. For 2-3 weeks; No driving while taking narcotic pain medication. Post  patients should not drive until pain free. No intercourse 4-6 weeks, no douching or tampon use. May resume exercise in 6 weeks. Discharge Instructions/Special Treatment/Home Care Needs:  
 
Continue prenatal vitamins. Continue to use squirt bottle with warm water on your episiotomy after each bathroom use until bleeding stops. If steri-strips applied to your incision, remove in 7 days. Take stool softeners daily. Call your doctor for the following:  
 
Fever over 101 degrees by mouth. Vaginal bleeding heavier than a normal menstrual period or lost larger than a golf ball. Red streaks or increased swelling of legs, painful red streaks on your breast. 
Painful urination, or increased pain, redness or discharge with your incision. Pain Management:  
 
Pain Management:  
Take Acetaminophen (Tylenol) or Ibuprofen (Advil, Motrin), as directed for pain. Use a warm Sitz bath 3 times daily to relieve episiotomy or hemorrhoidal discomfort. Heating pad to  incision as needed. For hemorrhoidal discomfort, use Tucks and Anusol cream as needed and directed. Follow-Up Care:  
 
Appointment with MD: Follow-up Appointments Procedures  FOLLOW UP VISIT Appointment in: 6 Weeks Standing Status:   Standing Number of Occurrences:   1 Order Specific Question:   Appointment in Answer:   6 Weeks Telephone number: [phone] Signed By: Rolando Rodríguez MD                                                                                                   Date: 3/15/2017 Time: 8:41 AM 
 
 
Discharge Orders None authorGENharQuestetra Announcement We are excited to announce that we are making your provider's discharge notes available to you in [employer]. You will see these notes when they are completed and signed by the physician that discharged you from your recent hospital stay. If you have any questions or concerns about any information you see in [employer], please call the Health Information Department where you were seen or reach out to your Primary Care Provider for more information about your plan of care. Introducing [facility]! Dear Keegan Landa: Thank you for requesting a [employer] account. Our records indicate that you already have an active [employer] account. You can access your account anytime at [email or web address] Did you know that you can access your hospital and ER discharge instructions at any time in [employer]? You can also review all of your test results from your hospital stay or ER visit. Additional Information If you have questions, please visit the Frequently Asked Questions section of the [employer] website at [email or web address] Remember, [employer] is NOT to be used for urgent needs. For medical emergencies, dial 911. Now available from your iPhone and Android! General Information Please provide this summary of care documentation to your next provider. Patient Signature:  ____________________________________________________________ Date:  ____________________________________________________________  
  
[facility]  Provider Signature: ____________________________________________________________ Date:  ____________________________________________________________

## 2017-03-14 NOTE — PROGRESS NOTES
0345:  Patient arrived to unit with complaints of contractions starting around 0130. She denies vaginal bleeding and leaking of fluid but states she had a small amount of discharge shortly after she began yenni. Patient reports not drinking much water today. RN placed patient on EFM and triage flowsheet completed. Water pitcher filled for patient and juice given to increase fetal reactivity. Patient reports normal fetal activity. 0400:  SVE by Flaca Marroquin RN closed. 2979:  RN speaking with Rosalinda Caldwell MD regarding above information and FHT. MD states to place a PIV and give 1 L bolus. TORB. RN can hear audible movement on ultrasound monitor. 2778:  RN speaking with Rosalinda Caldwell MD regarding FHT. MD states to run LR at 125 mL/hr and the on call OB for PHUONG will round on her after change of shift.  TORB.    7914:  RN speaking with Rosalinda Caldwell MD regarding FHT and deceleration. MD aware and no new orders received at this time. 1152: Bedside and Verbal shift change report given to AG Linda (oncoming nurse) by ESVIN Jackson RN (offgoing nurse). Report included the following information SBAR, Kardex, Intake/Output, MAR, Accordion and Recent Results.

## 2017-03-14 NOTE — PROGRESS NOTES
1120: Cervidil inserted. Orders received to keep patient on monitor for 2 hours post cervidil insertion and then do NST q shift. Remove cervidil ~ 2315, and start pitocin @ midnight. Will monitor. 1654: Patient requesting pain medicine, would like to try stadol for now and wait on epidural. Dr. Angela Panchal called, TORB received, 2mg stadol and phergan now. Will monitor. 1800: Cervidil removed at this time, due to FHR decelerations and patient yenni regularly. Will monitor. 1900: Bedside and Verbal shift change report given to Corazon Haas RN (oncoming nurse) by Jennifer Restrepo RN (offgoing nurse). Report included the following information SBAR, Kardex and Recent Results.

## 2017-03-14 NOTE — PROGRESS NOTES
1900 Assumed care of pt at this time from offgoing nurse, Geneva Dyson notified of pt's current status, increased pain level, and FHT. Per MD she will come to bedside shortly. 2020 Dr. Brenda Dyson at bedside, discussing pain control options, including epidural being the best option at this time. Pt agreeable. 2155 Dr. Shaheed Curry at bedside, aware of third dose of ephedrine given per Brenda Dyson MD.  2126 Dr. Brenda Dyson requested at bedside due to [facility]. Remains at bedside until 2205 when [facility] and pt's BP's have stabilized. 65 Per MD, continue with current plan, continue to monitor BPs. Hold off on starting epidural infusion until pt starts to complain of feeling contractions(Dr. Shaheed Curry aware and agreeable) due to hypotension. Will continue to monitor. Will also hold off on starting pitocin until further notice from MD.   Sandi Dyson requested at bedside due to fetal tracing. 200 FSE applied by MD. SVE = 1cm.  2325 Dr. Shaheed Curry notified RN was going to start epidural infusion due to pt's complaint of contraction pain. Order to run epidural at 10mL/hr instead of Olindakrista Dyson to bedside for NRFHT, SVE= unchanged. POC discussed with pt.   0144 C/s called by MD at this time. [facility] regarding pt's allergy to aspirin and whether or not it is contraindicated with Toradol. Per pharmacist, since pt claims to have no reaction to Motrin, she should not have any issue with Toradol. Mike Garg from Dr. Shaheed Curry for Toradol. MD made aware of RN's call to pharmacy regarding allergy to aspirin.

## 2017-03-14 NOTE — PROGRESS NOTES
Labor Progress Note  Patient seen, fetal heart rate and contraction pattern evaluated, patient examined. Patient feeling uncomfortable with contractions; fetal tracing with decelerations secondary to uterine tachysyst. Cervidil pulled. Physical Exam:  Cervical Exam: FT/40%effaced    Membranes:  Intact  Uterine Activity: Frequency: now every 4 minutes  Fetal Heart Rate: Category 1-II  Station -3    Assessment/Plan:  Reassuring fetal status,give stadol/phenergan x 1; ~6hrs reassess and begin pitocin. Titrate pitocin up to 8 and maintain at this level for 12 hrs (use as ripening agent). Monitor fetal tracing and  labor progress closely.    Discussed with pt and family, agree

## 2017-03-15 ENCOUNTER — SURGERY (OUTPATIENT)
Age: 32
End: 2017-03-15

## 2017-03-15 PROCEDURE — 74011250636 HC RX REV CODE- 250/636

## 2017-03-15 PROCEDURE — 75410000002 HC LABOR FEE PER 1 HR: Performed by: OBSTETRICS & GYNECOLOGY

## 2017-03-15 PROCEDURE — 74011250636 HC RX REV CODE- 250/636: Performed by: ANESTHESIOLOGY

## 2017-03-15 PROCEDURE — 10907ZC DRAINAGE OF AMNIOTIC FLUID, THERAPEUTIC FROM PRODUCTS OF CONCEPTION, VIA NATURAL OR ARTIFICIAL OPENING: ICD-10-PCS | Performed by: OBSTETRICS & GYNECOLOGY

## 2017-03-15 PROCEDURE — 76010000391 HC C SECN FIRST 1 HR: Performed by: OBSTETRICS & GYNECOLOGY

## 2017-03-15 PROCEDURE — 74011250637 HC RX REV CODE- 250/637: Performed by: OBSTETRICS & GYNECOLOGY

## 2017-03-15 PROCEDURE — 88307 TISSUE EXAM BY PATHOLOGIST: CPT | Performed by: OBSTETRICS & GYNECOLOGY

## 2017-03-15 PROCEDURE — 74011250636 HC RX REV CODE- 250/636: Performed by: OBSTETRICS & GYNECOLOGY

## 2017-03-15 PROCEDURE — 76060000078 HC EPIDURAL ANESTHESIA: Performed by: OBSTETRICS & GYNECOLOGY

## 2017-03-15 PROCEDURE — 74011000258 HC RX REV CODE- 258: Performed by: OBSTETRICS & GYNECOLOGY

## 2017-03-15 PROCEDURE — 75410000003 HC RECOV DEL/VAG/CSECN EA 0.5 HR: Performed by: OBSTETRICS & GYNECOLOGY

## 2017-03-15 PROCEDURE — 77030034850

## 2017-03-15 PROCEDURE — 65270000029 HC RM PRIVATE

## 2017-03-15 PROCEDURE — 77030036554

## 2017-03-15 RX ORDER — OXYTOCIN/RINGER'S LACTATE 20/1000 ML
125-500 PLASTIC BAG, INJECTION (ML) INTRAVENOUS ONCE
Status: ACTIVE | OUTPATIENT
Start: 2017-03-15 | End: 2017-03-15

## 2017-03-15 RX ORDER — IBUPROFEN 800 MG/1
800 TABLET ORAL EVERY 8 HOURS
Status: DISCONTINUED | OUTPATIENT
Start: 2017-03-16 | End: 2017-03-17 | Stop reason: HOSPADM

## 2017-03-15 RX ORDER — SIMETHICONE 80 MG
80 TABLET,CHEWABLE ORAL AS NEEDED
Status: DISCONTINUED | OUTPATIENT
Start: 2017-03-15 | End: 2017-03-17 | Stop reason: HOSPADM

## 2017-03-15 RX ORDER — IBUPROFEN 800 MG/1
800 TABLET ORAL EVERY 8 HOURS
Status: DISCONTINUED | OUTPATIENT
Start: 2017-03-15 | End: 2017-03-15

## 2017-03-15 RX ORDER — SODIUM CHLORIDE 0.9 % (FLUSH) 0.9 %
5-10 SYRINGE (ML) INJECTION EVERY 8 HOURS
Status: DISCONTINUED | OUTPATIENT
Start: 2017-03-15 | End: 2017-03-16

## 2017-03-15 RX ORDER — CEFAZOLIN SODIUM IN 0.9 % NACL 2 G/50 ML
INTRAVENOUS SOLUTION, PIGGYBACK (ML) INTRAVENOUS
Status: COMPLETED
Start: 2017-03-15 | End: 2017-03-15

## 2017-03-15 RX ORDER — ZOLPIDEM TARTRATE 5 MG/1
5 TABLET ORAL
Status: DISCONTINUED | OUTPATIENT
Start: 2017-03-15 | End: 2017-03-17 | Stop reason: HOSPADM

## 2017-03-15 RX ORDER — OXYCODONE AND ACETAMINOPHEN 5; 325 MG/1; MG/1
1 TABLET ORAL
Status: DISCONTINUED | OUTPATIENT
Start: 2017-03-15 | End: 2017-03-17 | Stop reason: HOSPADM

## 2017-03-15 RX ORDER — OXYTOCIN 10 [USP'U]/ML
INJECTION, SOLUTION INTRAMUSCULAR; INTRAVENOUS AS NEEDED
Status: DISCONTINUED | OUTPATIENT
Start: 2017-03-15 | End: 2017-03-15 | Stop reason: HOSPADM

## 2017-03-15 RX ORDER — SODIUM CHLORIDE 0.9 % (FLUSH) 0.9 %
5-10 SYRINGE (ML) INJECTION AS NEEDED
Status: DISCONTINUED | OUTPATIENT
Start: 2017-03-15 | End: 2017-03-17 | Stop reason: HOSPADM

## 2017-03-15 RX ORDER — CEFAZOLIN SODIUM IN 0.9 % NACL 2 G/50 ML
2 INTRAVENOUS SOLUTION, PIGGYBACK (ML) INTRAVENOUS ONCE
Status: COMPLETED | OUTPATIENT
Start: 2017-03-15 | End: 2017-03-15

## 2017-03-15 RX ORDER — SODIUM CHLORIDE, SODIUM LACTATE, POTASSIUM CHLORIDE, CALCIUM CHLORIDE 600; 310; 30; 20 MG/100ML; MG/100ML; MG/100ML; MG/100ML
125 INJECTION, SOLUTION INTRAVENOUS CONTINUOUS
Status: DISCONTINUED | OUTPATIENT
Start: 2017-03-15 | End: 2017-03-17 | Stop reason: HOSPADM

## 2017-03-15 RX ORDER — OXYTOCIN IN 5 % DEXTROSE 30/500 ML
1-25 PLASTIC BAG, INJECTION (ML) INTRAVENOUS
Status: DISCONTINUED | OUTPATIENT
Start: 2017-03-16 | End: 2017-03-17 | Stop reason: HOSPADM

## 2017-03-15 RX ORDER — KETOROLAC TROMETHAMINE 30 MG/ML
30 INJECTION, SOLUTION INTRAMUSCULAR; INTRAVENOUS EVERY 6 HOURS
Status: COMPLETED | OUTPATIENT
Start: 2017-03-15 | End: 2017-03-15

## 2017-03-15 RX ORDER — ONDANSETRON 2 MG/ML
INJECTION INTRAMUSCULAR; INTRAVENOUS AS NEEDED
Status: DISCONTINUED | OUTPATIENT
Start: 2017-03-15 | End: 2017-03-15 | Stop reason: HOSPADM

## 2017-03-15 RX ORDER — MORPHINE SULFATE 0.5 MG/ML
INJECTION, SOLUTION EPIDURAL; INTRATHECAL; INTRAVENOUS AS NEEDED
Status: DISCONTINUED | OUTPATIENT
Start: 2017-03-15 | End: 2017-03-15 | Stop reason: HOSPADM

## 2017-03-15 RX ORDER — NALOXONE HYDROCHLORIDE 0.4 MG/ML
0.4 INJECTION, SOLUTION INTRAMUSCULAR; INTRAVENOUS; SUBCUTANEOUS AS NEEDED
Status: DISCONTINUED | OUTPATIENT
Start: 2017-03-15 | End: 2017-03-17 | Stop reason: HOSPADM

## 2017-03-15 RX ORDER — OXYCODONE AND ACETAMINOPHEN 5; 325 MG/1; MG/1
1 TABLET ORAL
Qty: 30 TAB | Refills: 0 | Status: SHIPPED | OUTPATIENT
Start: 2017-03-15 | End: 2021-06-04

## 2017-03-15 RX ADMIN — SODIUM CHLORIDE, SODIUM LACTATE, POTASSIUM CHLORIDE, AND CALCIUM CHLORIDE 125 ML/HR: 600; 310; 30; 20 INJECTION, SOLUTION INTRAVENOUS at 11:27

## 2017-03-15 RX ADMIN — KETOROLAC TROMETHAMINE 30 MG: 30 INJECTION, SOLUTION INTRAMUSCULAR at 04:37

## 2017-03-15 RX ADMIN — ONDANSETRON 4 MG: 2 INJECTION INTRAMUSCULAR; INTRAVENOUS at 02:43

## 2017-03-15 RX ADMIN — KETOROLAC TROMETHAMINE 30 MG: 30 INJECTION, SOLUTION INTRAMUSCULAR at 17:27

## 2017-03-15 RX ADMIN — MORPHINE SULFATE 3 MG: 0.5 INJECTION, SOLUTION EPIDURAL; INTRATHECAL; INTRAVENOUS at 02:34

## 2017-03-15 RX ADMIN — PENICILLIN G POTASSIUM 5 MILLION UNITS: 5000000 INJECTION, POWDER, FOR SOLUTION INTRAMUSCULAR; INTRAVENOUS at 00:15

## 2017-03-15 RX ADMIN — LIDOCAINE HYDROCHLORIDE AND EPINEPHRINE 10 ML: 20; 5 INJECTION, SOLUTION EPIDURAL; INFILTRATION; INTRACAUDAL; PERINEURAL at 01:57

## 2017-03-15 RX ADMIN — Medication 10 ML: at 23:07

## 2017-03-15 RX ADMIN — OXYCODONE HYDROCHLORIDE AND ACETAMINOPHEN 1 TABLET: 5; 325 TABLET ORAL at 19:42

## 2017-03-15 RX ADMIN — KETOROLAC TROMETHAMINE 30 MG: 30 INJECTION, SOLUTION INTRAMUSCULAR at 23:07

## 2017-03-15 RX ADMIN — CEFAZOLIN 2 G: 1 INJECTION, POWDER, FOR SOLUTION INTRAMUSCULAR; INTRAVENOUS; PARENTERAL at 02:10

## 2017-03-15 RX ADMIN — CEFAZOLIN 2 G: 1 INJECTION, POWDER, FOR SOLUTION INTRAMUSCULAR; INTRAVENOUS; PARENTERAL at 01:51

## 2017-03-15 RX ADMIN — SODIUM CHLORIDE, SODIUM LACTATE, POTASSIUM CHLORIDE, AND CALCIUM CHLORIDE 125 ML/HR: 600; 310; 30; 20 INJECTION, SOLUTION INTRAVENOUS at 01:25

## 2017-03-15 RX ADMIN — KETOROLAC TROMETHAMINE 30 MG: 30 INJECTION, SOLUTION INTRAMUSCULAR at 11:13

## 2017-03-15 RX ADMIN — LIDOCAINE HYDROCHLORIDE AND EPINEPHRINE 10 ML: 20; 5 INJECTION, SOLUTION EPIDURAL; INFILTRATION; INTRACAUDAL; PERINEURAL at 02:11

## 2017-03-15 RX ADMIN — Medication 80 MG: at 17:27

## 2017-03-15 RX ADMIN — OXYTOCIN 20 UNITS: 10 INJECTION, SOLUTION INTRAMUSCULAR; INTRAVENOUS at 02:20

## 2017-03-15 RX ADMIN — OXYCODONE HYDROCHLORIDE AND ACETAMINOPHEN 1 TABLET: 5; 325 TABLET ORAL at 23:07

## 2017-03-15 RX ADMIN — SODIUM CHLORIDE, SODIUM LACTATE, POTASSIUM CHLORIDE, AND CALCIUM CHLORIDE 125 ML/HR: 600; 310; 30; 20 INJECTION, SOLUTION INTRAVENOUS at 03:21

## 2017-03-15 NOTE — PROGRESS NOTES
Patient status post CSE. Fetal tracing with decelerations; hypotensive status; ephedrine doses given; anesthesia notified and present in room. Terbutaline given x 1; Resuscitative manuvers implemented. O2 face mask on; IV fluids running; repositioning. Fetal tracing baseline 140's, moderate variability, +accels, subtle decels; reassuring tracing. Cervix: unchanged    BP's improving and stable. Continue to monitor labor progress. Place canada catheter. Dr. Victoria Mtz.

## 2017-03-15 NOTE — L&D DELIVERY NOTE
Delivery Summary    Patient: Sunil Lira MRN: [id number]  SSN: [id number]    YOB: 1985  Age: 33 y.o. Sex: female        Labor Events:    Labor: No    Rupture Date:      Rupture Time:      Rupture Type: AROM    Amniotic Fluid Volume: Scant     Amniotic Fluid Description:  Amniotic fluid Odor: Clear          Induction: AROM         Induction Date:        Induction Time:       Indications for Induction: Gestational Hypertension; Fetal Heart Rate or Rhythm Abnormality     Augmentation: None    Augmentation Date:      Augmentation Time:      Indications for Augmentation:      Events:   Non reassuring fetal heart tracing    Cervical Ripening:       Cervidil    Rupture Identifier:       Labor complications: Fetal Intolerance; Other (comment)     Additional complications:         Delivery Events:  Estimated Blood Loss (ml):        Information for the patient's :  Umair Rosas, Male [[id number]]     Delivery Summary - Baby    Delivery Date: 3/15/2017  Delivery Time: 2:18 AM  Delivery Type: , Low Transverse    Section Delivery:     Sex:  male     Gestational Age: 44w2d  Delivery Clinician:  Nura Favorite   Living?: Yes  Delivery Location: OR           [facility]  One minute Five minutes Ten minutes   Skin color: 1   1        Heart rate: 2   2        Grimace: 2   2        Muscle tone: 2   2        Breathin   2        Totals: 9   9           Presentation: Vertex    Position:        Resuscitation Method:  Suctioning-bulb; Tactile Stimulation     Meconium Stained: None      Cord Vessels: 3 Vessels      Cord Events:    Cord Blood Sent?:  Yes    Blood Gases Sent?:  Yes    Placenta:  Date/Time: 3/15  2:19 AM  Removal: Manual Removal      Appearance: Normal;Intact      Measurements:  Birth Weight: 3.35 kg      Birth Length: 49.5 cm      Head Circumference: 33.5 cm      Chest Circumference: 34 cm     Abdominal Girth: 32.5 cm    Other Providers:           Group Beta Strep:   Lab Results Component Value Date/Time    GrBStrep, External Positive 02/10/2017        Cord Blood Results:  Information for the patient's :  Dmitriy Carrillo, Male [[id number]]   No results found for: Izora Heady, PCTDIG, BILI, ABORHEXT, ABORH    Information for the patient's :  Dmitriy Carrillo, Male [[id number]]   No results found for: APH, APCO2, APO2, AHCO3, ABEC, ABDC, O2ST, SITE, [place], PHI, [place], PO2I, HCO3I, SO2I, IBD    Information for the patient's :  Dmitriy Carrillo, Male [[id number]]   No results found for: EPHV, PCO2V, PO2V, HCO3V, O2STV, EBDV

## 2017-03-15 NOTE — PROGRESS NOTES
Bedside and Verbal shift change report given to Susy Stephenson RN (oncoming nurse) by Foster Fields RN (offgoing nurse). Report included the following information SBAR, Intake/Output, MAR and Recent Results.

## 2017-03-15 NOTE — PROGRESS NOTES
Problem: Lactation Care Plan  Goal: *Infant latching appropriately  Outcome: Progressing Towards Goal  Pt will successfully establish breastfeeding by feeding in response to infant's early feeding cues and/or to offer breast every 2-3 hours. Ways to obtain a deep latch and seek comfortable positioning shared, aware to keep log of feedings/output. Goal: *Weight loss less than 10% of birth weight  Outcome: Progressing Towards Goal  Encouraged mom to attempt feeding every 2-3 hours in the first couple of days. Looking for 8-12 feedings in 24 hours. Don't limit baby at breast, allow baby to come of breast on it's own. Baby may want to feed more often then every 2-3 hours. Baby may not feed that often, but feedings should be attempted. If baby doesn't nurse,  Mom can hand express drops of colostrum and drip into baby's mouth, or  give to baby by finger feeding, cup feeding,or spoon feeding. Encouraged skin to skin. Pt chooses to do both breast and bottle. Discussed effects of early supplementation on breastfeeding success; may decrease breastmilk production and supply, increase risk for pathological engorgement, baby may develop preference for faster flow from bottles vs breast, and baby's stomach can be stretched if larger volumes of formula are given. Instructed mother to offer baby breast first so that baby gets mother's immunity and antibodies. Problem: Patient Education: Go to Patient Education Activity  Goal: Patient/Family Education  Outcome: Progressing Towards Goal  Discussed with mother her plan for feeding. Reviewed the benefits of exclusive breast milk feeding during the hospital stay. Informed her of the risks of using formula to supplement in the first few days of life as well as the benefits of successful breast milk feeding; referred her to the Breastfeeding booklet about this information.    She acknowledges understanding of information reviewed and states that it is her plan to breast/formula feed her infant. Will support her choice and offer additional information as needed. Hand Expression Education:  Mom taught how to manually hand express her colostrum. Emphasized the importance of providing infant with valuable colostrum as infant rests skin to skin at breast.  Aware to avoid extended periods of non-feeding. Aware to offer 10-20+ drops of colostrum every 2-3 hours until infant is latching and nursing effectively. Taught the rationale behind this low tech but highly effective evidence based practice. Mother has flat,inverted nipples. Instructed mom on how to use latch assist to help draw out nipples. Discussed how to hand stimulate nipples to help draw them out. Discussed using nipple shells to slowly draw out nipples. Mother has shells she brought in from home. Shield use recommended due to flat , inverted nipples; use of shield affords deeper more comfortable latching with sustained rhythmic suckling and intermittent swallowing noted. Proper care, application and use of shield discussed; anticipatory guidance shared. Mother to pump a few times a day if using nipple shield to help stimulate her breast milk production. Guidelines for pumping, milk collection and storage, proper cleaning of pump parts all reviewed. How to establish and maintain breast milk supply through pumping reviewed. Differences between hospital grade rental pumps vs store bought double electric/hand pumps discussed. Set up pumping with double electric set up. Assisted with pump session. List of area pump rental locations and lactation support services provided. Mother also has a pump in style from home -  gave instructions for use. Comments:   Pt will successfully establish breastfeeding by feeding in response to early feeding cues   or wake every 3h, will obtain deep latch, and will keep log of feedings/output.   Taught to BF at hunger cues and or q 2-3 hrs and to offer 10-20 drops of hand expressed colostrum at any non-feeds. Breast Assessment  Left Breast: Medium, Large  Left Nipple: Intact, Flat, Inverted  Right Breast: Medium, Large  Right Nipple: Intact, Flat, Inverted  Breast- Feeding Assessment  Attends Breast-Feeding Classes: Yes  Breast-Feeding Experience: No  Breast Trauma/Surgery: No  Type/Quality: Fair (Mother states baby  well after delivery but has been sleepy since. Baby less than 24 hr.  old - mom reasurred this is normal the first 24 hr., )  Lactation Consultant Visits  Breast-Feedings: Good  (Mother has flat inverted nipples. Taught her how to use latch assist - left nipple everts with latch assist but right does not. Mother used shield this feeding which helped baby latch on well. )  Mother/Infant Observation  Mother Observation: Alignment, Breast comfortable, Close hold, Holds breast  Infant Observation: Audible swallows, Lips flanged, lower, Latches nipple and aereolae, Lips flanged, upper, Opens mouth, Rhythmic suck  LATCH Documentation  Latch: Grasps breast, tongue down, lips flanged, rhythmic sucking  Audible Swallowing: A few with stimulation  Type of Nipple:  Inverted (latched on well with nipple shield)  Comfort (Breast/Nipple): Soft/non-tender  Hold (Positioning): No assist from staff, mother able to position/hold infant  LATCH Score: 7

## 2017-03-15 NOTE — PROGRESS NOTES
Labor Progress Note  Patient seen, fetal heart rate and contraction pattern evaluated, patient examined. Physical Exam:  Cervical Exam: 1 cm dilated, 60% effaced  Membranes:  Intact  Uterine Activity: Frequency: Every 4 minutes  Fetal Heart Rate: Category III fetal tracing  Station -3    Assessment/Plan:  Recurrent late decelerations, proceed to primary . Prepare for OR.    Discussed with pt and family, agree

## 2017-03-15 NOTE — ANESTHESIA PROCEDURE NOTES
Epidural Block    Start time: 3/14/2017 9:00 PM  End time: 3/14/2017 9:16 PM  Performed by: Tabatha Gross by: Talon Braden     Pre-Procedure  Indication: labor epidural    Preanesthetic Checklist: patient identified, risks and benefits discussed, anesthesia consent, patient being monitored, timeout performed and anesthesia consent    Timeout Time: 21:00        Epidural:   Patient position:  Seated  Prep region:  Lumbar  Prep: Betadine    Location:  L3-4    Needle and Epidural Catheter:   Needle Type:  Tuohy  Needle Gauge:  17 G  Injection Technique:  Loss of resistance using air  Attempts:  1  Catheter Size:  20 G  Catheter at Skin Depth (cm):  12  Depth in Epidural Space (cm):  5  Events: no blood with aspiration, no cerebrospinal fluid with aspiration and negative aspiration test    Test Dose:  Lidocaine 1.5% w/ epi and negative    Assessment:   Catheter Secured:  Tape  Insertion:  Uncomplicated  Patient tolerance:  Patient tolerated the procedure well with no immediate complications

## 2017-03-15 NOTE — OP NOTES
OPERATIVE REPORT         Name: Amada Roa    Medical Record Number: [id number]    YOB: 1985   Today's Date: March 15, 2017      PREOPERATIVE DIAGNOSES   39.2weeks gestation, NRFHT    POSTOPERATIVE DIAGNOSES   39.2weeks gestation, NRFHT    PROCEDURE PERFORMED:   Primary low transverse  section via Pfannenstiel. SURGEON: Tex Merlin, MD     ASSISTANT: ALEE     ANESTHESIA: Epidural     COMPLICATIONS: None. ESTIMATED BLOOD LOSS: 800 ml     INTRAVENOUS FLUIDS: 800 ml     URINE OUTPUT: Approximately 150 mL clear urine at the end of the   procedure. INDICATIONS: Recurrent late decelerations    FINDINGS: Live Male infant, 7Ibs 6ozs, 3350 grams; apgar 9/9; normal uterus, tubes and ovaries    SPECIMENS REMOVED: Placenta    DESCRIPTION OF PROCEDURE:   The patient was taken to the operating room, where epidural anesthesia was found to be adequate. She was then prepared and draped in the normal sterile fashion in the dorsal supine position, with a left-walled tilt. A Pfannenstiel skin incision was then made with a scalpel, and carried through to the underlying layer of fascia with the   Bovie. The fascia was incised in the midline, and the incision extended   laterally with the Shah scissors. The superior aspect of the fascial   incision was then grasped with the Kocher clamps, elevated, and the   underlying rectus muscles dissected off bluntly. Attention was then turned   to the inferior aspect of this incision, which in a similar fashion was   grasped, tented up with the Kocher clamps, and the rectus muscles dissected   off bluntly. The rectus muscles were then  in the midline, and the   peritoneum identified, tented up, and entered sharply with the Metzenbaum   scissors. The peritoneal incision was then extended superiorly and   inferiorly, with good visualization of the bladder.  A bladder blade was   then inserted, and the vesicouterine peritoneum identified, grasped with the pickups, and entered sharply with the Metzenbaum scissors. This   incision was then extended laterally, and the bladder flap was created   digitally. The bladder blade was then reinserted, and the lower uterine segment   incised in a transverse fashion with a scalpel. The uterine incision was   then extended laterally. The bladder blade was then removed, and the   infant's head delivered atraumatically. The nose and mouth were suctioned, the delayed cord clamping for 1min;   was clamped and cut, and the infant was handed off to the awaiting Pediatrician. Cord blood was obtained, and cord gases were sent. The placenta was then removed manually, the uterus was exteriorized and   cleared of all clots and debris. The uterine incision was repaired with 0   Monocryl in a running, locked fashion. A second layer of the same suture   was used to obtain excellent hemostasis. The bladder flap was then repaired   with 3-0 Vicryl in a running stitch, and the posterior cul-de-sac was then   suctioned for all fluid. The uterus was now returned intra-abdominally. The   gutters were cleared of all clots, and the peritoneum was closed with 3-0   Vicryl. The fascia was reapproximated with 0 Vicryl in a running fashion. The subcutaneous tissue layer was then irrigated and dried. The skin was   now closed in a subcuticular fashion using a monocryl suture. . Steri-Strips were   applied. The patient tolerated the procedure well. Sponge, lap and needle counts   were correct x2. Ancef 2 g were given prior to making of the incision. The   patient was taken to the recovery room in stable condition.        Mona Rosenberg MD

## 2017-03-15 NOTE — ANESTHESIA PREPROCEDURE EVALUATION
Anesthetic History   No history of anesthetic complications            Review of Systems / Medical History  Patient summary reviewed, nursing notes reviewed and pertinent labs reviewed    Pulmonary  Within defined limits          Asthma        Neuro/Psych   Within defined limits           Cardiovascular  Within defined limits                Exercise tolerance: >4 METS     GI/Hepatic/Renal  Within defined limits              Endo/Other  Within defined limits      Obesity     Other Findings              Physical Exam    Airway  Mallampati: II    Neck ROM: normal range of motion   Mouth opening: Normal     Cardiovascular  Regular rate and rhythm,  S1 and S2 normal,  no murmur, click, rub, or gallop  Rhythm: regular  Rate: normal         Dental  No notable dental hx       Pulmonary  Breath sounds clear to auscultation               Abdominal  GI exam deferred       Other Findings            Anesthetic Plan    ASA: 2  Anesthesia type: epidural      Post-op pain plan if not by surgeon: indwelling epidural catheter    Induction: Intravenous  Anesthetic plan and risks discussed with: Patient      Late entry - preop done, questions answered, and consent obtained prior to procedure; note entered after procedure at 9:15 PM

## 2017-03-15 NOTE — PROGRESS NOTES
SBAR IN Report: Mother    Verbal report received from Lan Bo RN (full name & credentials) on this patient, who is now being transferred from Labor and Delivery (unit) for routine progression of care. The patient is not wearing a green \"Anesthesia-Duramorph\" band. Report consisted of patient's Situation, Background, Assessment and Recommendations (SBAR). [place] ID bands were compared with the identification form, and verified with the patient and transferring nurse. Information from the SBAR, Intake/Output, MAR and Recent Results and the Franc Report was reviewed with the transferring nurse; opportunity for questions and clarification provided.

## 2017-03-15 NOTE — DISCHARGE SUMMARY
Obstetrical Discharge Summary     Name: Salazar Junior MRN: [id number]  SSN: [id number]    YOB: 1985  Age: 33 y.o. Sex: female      Admit Date: 3/14/2017    Discharge Date: 3/15/2017     Admitting Physician: Jerzy Anguiano MD     Attending Physician:  Jerzy Anguiano MD     * Admission Diagnoses: CERVIDIL    * Discharge Diagnoses:   Information for the patient's :  Shanice Whipple, Male [[id number]]   Delivery of a 7 lb 6.2 oz (3.35 kg) male infant via , Low Transverse on 3/15/2017 at 2:18 AM  by . Apgars were 9 and 9. Additional Diagnoses:   Hospital Problems as of 3/15/2017  Date Reviewed: 3/10/2017          Codes Class Noted - Resolved POA    Pregnancy ICD-10-CM: Z33.1  ICD-9-CM: V22.2  3/14/2017 - Present Unknown             Lab Results   Component Value Date/Time    Rubella, External Immune 2016    GrBStrep, External Positive 02/10/2017    ABO,Rh O Positive 2016      Immunization History   Administered Date(s) Administered    Tdap 2016       * Procedures: Procedure(s):   SECTION       * Discharge Condition: stable    * Hospital Course: Normal hospital course following the delivery. * Disposition: home with office follow-up    Discharge Medications:   Current Discharge Medication List      START taking these medications    Details   oxyCODONE-acetaminophen (PERCOCET) 5-325 mg per tablet Take 1 Tab by mouth every four (4) hours as needed. Max Daily Amount: 6 Tabs. Qty: 30 Tab, Refills: 0         CONTINUE these medications which have NOT CHANGED    Details   PNV no.24-iron-folic acid-dha (PRENATAL DHA+COMPLETE PRENATAL) -300 mg-mcg-mg cmpk Take  by mouth. * Follow-up Care/Patient Instructions:   Activity: activity as tolerated  Diet: general  Wound Care: as directed    Follow-up Information     Follow up With Details Comments [facility] Leo Ignacio MD   [address] [place] [address],[address]  [address] [place] [address]  [phone] Felipe Driscoll MD In 6 weeks  [address]  [phone]             Signed By:  Felipe Driscoll MD     March 15, 2017

## 2017-03-15 NOTE — ROUTINE PROCESS
TRANSFER - OUT REPORT:    Verbal report given to Jennifer Burger RN(name) on Vivi Humphries  being transferred to MIU(unit) for routine progression of care       Report consisted of patients Situation, Background, Assessment and   Recommendations(SBAR). Information from the following report(s) SBAR, Kardex, Intake/Output, MAR, Recent Results and Med Rec Status was reviewed with the receiving nurse. Lines:   Peripheral IV 03/14/17 Left; Lower Forearm (Active)   Site Assessment Clean, dry, & intact 3/14/2017  7:13 PM   Phlebitis Assessment 0 3/14/2017  7:13 PM   Infiltration Assessment 0 3/14/2017  7:13 PM   Dressing Status Clean, dry, & intact 3/14/2017  7:13 PM   Dressing Type Tape;Transparent 3/14/2017  7:13 PM   Hub Color/Line Status Pink 3/14/2017  7:13 PM   Action Taken Blood drawn 3/14/2017  5:02 AM        Opportunity for questions and clarification was provided.       Patient transported with:   Registered Nurse

## 2017-03-15 NOTE — ROUTINE PROCESS
Bedside and Verbal shift change report given to suresh gong rn (oncoming nurse) by sangeetha nicolas rn (offgoing nurse). Report included the following information SBAR, Kardex, OR Summary, Procedure Summary, Intake/Output, MAR, Accordion and Recent Results.

## 2017-03-15 NOTE — PROGRESS NOTES
2128:  RN speaking with Shaheed Curry MD at this time regarding patient's blood pressures despite one dose of ephedrine. MD states a repeat dose may be given.  TORB.

## 2017-03-15 NOTE — PROGRESS NOTES
Labor Progress Note  Patient seen, fetal heart rate and contraction pattern evaluated, patient examined. Physical Exam:  Cervical Exam: 1cm dilated ; 60% effaced  Membranes:  AROM  Uterine Activity: Frequency: Every 2- 4 minutes  Fetal Heart Rate: 140 bpm, reassuring  Station -3    Assessment/Plan:  Fetal tracing with decels after [place]ada cath placed, SVE performed; FSE placed, AROM occurred- clear fluid.  Monitor labor progress  Discussed with pt and family, agree

## 2017-03-16 LAB
BASOPHILS # BLD AUTO: 0 K/UL (ref 0–0.1)
BASOPHILS # BLD: 0 % (ref 0–1)
EOSINOPHIL # BLD: 0.1 K/UL (ref 0–0.4)
EOSINOPHIL NFR BLD: 1 % (ref 0–7)
ERYTHROCYTE [DISTWIDTH] IN BLOOD BY AUTOMATED COUNT: 16 % (ref 11.5–14.5)
HCT VFR BLD AUTO: 32.8 % (ref 35–47)
HGB BLD-MCNC: 10 G/DL (ref 11.5–16)
LYMPHOCYTES # BLD AUTO: 14 % (ref 12–49)
LYMPHOCYTES # BLD: 1.6 K/UL (ref 0.8–3.5)
MCH RBC QN AUTO: 27 PG (ref 26–34)
MCHC RBC AUTO-ENTMCNC: 30.5 G/DL (ref 30–36.5)
MCV RBC AUTO: 88.4 FL (ref 80–99)
MONOCYTES # BLD: 0.7 K/UL (ref 0–1)
MONOCYTES NFR BLD AUTO: 6 % (ref 5–13)
NEUTS SEG # BLD: 9 K/UL (ref 1.8–8)
NEUTS SEG NFR BLD AUTO: 79 % (ref 32–75)
PLATELET # BLD AUTO: 198 K/UL (ref 150–400)
RBC # BLD AUTO: 3.71 M/UL (ref 3.8–5.2)
WBC # BLD AUTO: 11.4 K/UL (ref 3.6–11)

## 2017-03-16 PROCEDURE — 36415 COLL VENOUS BLD VENIPUNCTURE: CPT | Performed by: OBSTETRICS & GYNECOLOGY

## 2017-03-16 PROCEDURE — 85025 COMPLETE CBC W/AUTO DIFF WBC: CPT | Performed by: OBSTETRICS & GYNECOLOGY

## 2017-03-16 PROCEDURE — 74011250637 HC RX REV CODE- 250/637: Performed by: OBSTETRICS & GYNECOLOGY

## 2017-03-16 PROCEDURE — 65270000029 HC RM PRIVATE

## 2017-03-16 RX ADMIN — OXYCODONE HYDROCHLORIDE AND ACETAMINOPHEN 1 TABLET: 5; 325 TABLET ORAL at 11:21

## 2017-03-16 RX ADMIN — IBUPROFEN 800 MG: 800 TABLET, FILM COATED ORAL at 22:10

## 2017-03-16 RX ADMIN — OXYCODONE HYDROCHLORIDE AND ACETAMINOPHEN 1 TABLET: 5; 325 TABLET ORAL at 04:13

## 2017-03-16 RX ADMIN — IBUPROFEN 800 MG: 800 TABLET, FILM COATED ORAL at 06:01

## 2017-03-16 RX ADMIN — OXYCODONE HYDROCHLORIDE AND ACETAMINOPHEN 1 TABLET: 5; 325 TABLET ORAL at 19:45

## 2017-03-16 RX ADMIN — Medication 80 MG: at 08:00

## 2017-03-16 RX ADMIN — OXYCODONE HYDROCHLORIDE AND ACETAMINOPHEN 1 TABLET: 5; 325 TABLET ORAL at 23:21

## 2017-03-16 RX ADMIN — IBUPROFEN 800 MG: 800 TABLET, FILM COATED ORAL at 14:00

## 2017-03-16 NOTE — PROGRESS NOTES
Bedside shift change report given to Kaylan Perez RN (oncoming nurse) by Satihs Nuno RN (offgoing nurse). Report included the following information SBAR, Kardex, Intake/Output and MAR.

## 2017-03-16 NOTE — PROGRESS NOTES
Post-Operative Day Number 1 Progress Note    Patient doing well post-op day 1 from  delivery without significant complaints. Pain controlled on current medication. Voiding without difficulty, normal lochia. Vitals:  Patient Vitals for the past 8 hrs:   BP Temp Pulse Resp   17 0736 142/80 98.2 °F (36.8 °C) 86 18     Temp (24hrs), Av.6 °F (37 °C), Min:98.2 °F (36.8 °C), Max:99.2 °F (37.3 °C)      Vital signs stable, afebrile. Exam:  Patient without distress. Abdomen soft, fundus firm at level of umbilicus, nontender. Incision dry and clean without erythema. Lower extremities are negative for swelling, cords or tenderness. Labs:   Recent Results (from the past 24 hour(s))   CBC WITH AUTOMATED DIFF    Collection Time: 17  4:16 AM   Result Value Ref Range    WBC 11.4 (H) 3.6 - 11.0 K/uL    RBC 3.71 (L) 3.80 - 5.20 M/uL    HGB 10.0 (L) 11.5 - 16.0 g/dL    HCT 32.8 (L) 35.0 - 47.0 %    MCV 88.4 80.0 - 99.0 FL    MCH 27.0 26.0 - 34.0 PG    MCHC 30.5 30.0 - 36.5 g/dL    RDW 16.0 (H) 11.5 - 14.5 %    PLATELET 776 481 - 933 K/uL    NEUTROPHILS 79 (H) 32 - 75 %    LYMPHOCYTES 14 12 - 49 %    MONOCYTES 6 5 - 13 %    EOSINOPHILS 1 0 - 7 %    BASOPHILS 0 0 - 1 %    ABS. NEUTROPHILS 9.0 (H) 1.8 - 8.0 K/UL    ABS. LYMPHOCYTES 1.6 0.8 - 3.5 K/UL    ABS. MONOCYTES 0.7 0.0 - 1.0 K/UL    ABS. EOSINOPHILS 0.1 0.0 - 0.4 K/UL    ABS. BASOPHILS 0.0 0.0 - 0.1 K/UL       Assessment and Plan:  Patient appears to be having uncomplicated post- course. Continue routine post-op care and maternal education. Pt requests early discharge tomorrow.

## 2017-03-16 NOTE — PROGRESS NOTES
Patient complaining of headache that began after c section. The headache improves when supine. She is talking to me from an upright position and says her headache is mild and improving. At this point a blood patch oliver probably be premature since the headache is mild and improving. She is likely to be in hospital until tomorrow, so if she needs a bloodpatch we could reassess then. Patient agrees with plan.

## 2017-03-16 NOTE — PROGRESS NOTES
Bedside and Verbal shift change report given to Chemo Hughes RN (oncoming nurse) by Nelly Cheung RN (offgoing nurse). Report included the following information SBAR, Intake/Output, MAR and Recent Results.

## 2017-03-16 NOTE — LACTATION NOTE
Mother resting in bed, FOB and family at bedside. Mother states she did BF some yesterday with use of shield but would like to only pump and feed EBM. Talked to mother regarding pumping and feeding baby both EBM and formula feeding. Parents asking lots of questions, answered questions. Encouraged mother to pump every 3 hours, feed baby any EBM (call nurse for assistance). Reviewed benefits of BF to both mother and baby. Pt chooses to do both breast and bottle. Discussed effects of early supplementation on breastfeeding success; may decrease breastmilk production and supply, increase risk for pathological engorgement, baby may develop preference for faster flow from bottles vs breast, and baby's stomach can be stretched if larger volumes of formula are given. Mother's feeding goals:  1.) To not feed infant at the breast  2.) To pump to establish milk supply 3.) To offer formula until her milk is established. Pump set up with instruction. Expressing Your Best, exclusive pumping materials provided. Pt will successfully establish breastfeeding by feeding in response to early feeding cues   or wake every 3h, will obtain deep latch, and will keep log of feedings/output. Taught to BF at hunger cues and or q 2-3 hrs and to offer 10-20 drops of hand expressed colostrum at any non-feeds. Breast Assessment  Left Breast: Medium, Large  Left Nipple: Intact, Flat, Inverted  Right Breast: Medium, Large  Right Nipple: Intact, Flat, Inverted  Breast- Feeding Assessment  Attends Breast-Feeding Classes: Yes  Breast-Feeding Experience: No  Breast Trauma/Surgery: No  Type/Quality:  (mother states she wants to pump and feed EBM)      Called into mothers room, mother attempting to BF. Assisted mother with positioning and latching baby at breast.  Baby latches well, takes a few sucks then falls asleep. Encouraged mother to keep up the good work.  Explained that we would supportive of her in however she decides to feed her baby. Pt will successfully establish breastfeeding by feeding in response to early feeding cues   or wake every 3h, will obtain deep latch, and will keep log of feedings/output. Taught to BF at hunger cues and or q 2-3 hrs and to offer 10-20 drops of hand expressed colostrum at any non-feeds. Breast Assessment  Left Breast: Medium, Large  Left Nipple: Intact, Flat, Inverted  Right Breast: Medium, Large  Right Nipple: Intact, Flat, Inverted  Breast- Feeding Assessment  Attends Breast-Feeding Classes: Yes  Breast-Feeding Experience: No  Breast Trauma/Surgery: No  Type/Quality:  (mother states she wants to pump and feed EBM)  Lactation Consultant Visits  Breast-Feedings: Good  (Mother has flat inverted nipples. Taught her how to use latch assist - left nipple everts with latch assist but right does not. Mother used shield this feeding which helped baby latch on well. )  Mother/Infant Observation  Mother Observation: Breast comfortable  Infant Observation: Rhythmic suck, Feeding cues  LATCH Documentation  Latch: Grasps breast, tongue down, lips flanged, rhythmic sucking  Audible Swallowing: A few with stimulation  Type of Nipple:  Inverted (will robyn with latch assist)  Comfort (Breast/Nipple): Soft/non-tender  Hold (Positioning): Full assist, teach one side, mother does other, staff holds  [facility] 6

## 2017-03-17 ENCOUNTER — SURGERY (OUTPATIENT)
Age: 32
End: 2017-03-17

## 2017-03-17 ENCOUNTER — ANESTHESIA EVENT (OUTPATIENT)
Dept: MOTHER INFANT UNIT | Age: 32
End: 2017-03-17
Payer: COMMERCIAL

## 2017-03-17 ENCOUNTER — ANESTHESIA (OUTPATIENT)
Dept: MOTHER INFANT UNIT | Age: 32
End: 2017-03-17
Payer: COMMERCIAL

## 2017-03-17 VITALS
OXYGEN SATURATION: 96 % | RESPIRATION RATE: 16 BRPM | DIASTOLIC BLOOD PRESSURE: 79 MMHG | SYSTOLIC BLOOD PRESSURE: 136 MMHG | BODY MASS INDEX: 43.82 KG/M2 | HEIGHT: 65 IN | WEIGHT: 263 LBS | TEMPERATURE: 98.4 F | HEART RATE: 88 BPM

## 2017-03-17 PROCEDURE — 76010000093 HC SPECIAL PROCEDURE

## 2017-03-17 PROCEDURE — 76060000043 HC ANESTHESIA 6 TO 6.5 HR

## 2017-03-17 PROCEDURE — 77030014125 HC TY EPDRL BBMI -B: Performed by: ANESTHESIOLOGY

## 2017-03-17 PROCEDURE — 74011250637 HC RX REV CODE- 250/637: Performed by: OBSTETRICS & GYNECOLOGY

## 2017-03-17 PROCEDURE — 74011250636 HC RX REV CODE- 250/636

## 2017-03-17 RX ADMIN — OXYCODONE HYDROCHLORIDE AND ACETAMINOPHEN 1 TABLET: 5; 325 TABLET ORAL at 10:36

## 2017-03-17 RX ADMIN — IBUPROFEN 800 MG: 800 TABLET, FILM COATED ORAL at 06:22

## 2017-03-17 RX ADMIN — OXYCODONE HYDROCHLORIDE AND ACETAMINOPHEN 1 TABLET: 5; 325 TABLET ORAL at 04:39

## 2017-03-17 NOTE — ANESTHESIA POSTPROCEDURE EVALUATION
Epidural blood patch completed without incident (see procedure note) - pt kept supine for 30 min following procedure then on standing pt states headache has resolved. Gave pt the number to our office and instructed to call if headache worsens or she experiences any complications such as signs of infection at the stie.     Noe Velasco MD

## 2017-03-17 NOTE — ANESTHESIA PROCEDURE NOTES
Blood Patch  Performed by: John Gan by: Jerardo Boyd   Start Time:  3/17/2017 9:25 AM  End Time:  3/17/2017 9:46 AM    Pre-procedure: Indication: CSF leak      Preanesthetic Checklist: patient identified, risks and benefits discussed, patient being monitored and timeout performed      Timeout Time:  09:25    Blood Patch:   Monitoring:  Continuous pulse oximetry, blood pressure monitoring and EKG  Location of venous blood draw:  Arm  Patient Position:  Seated  Prep Region:  Lumbar  Prep: Betadine      Local:  Lidocaine 1%  Location:  L3-4    Injection Technique:  AMANDA air  Needle Type:  Tuohy  Needle Gauge:  17 G  Sterile Blood Injected (mL):  20    Assessment:   Attempts:  1      Patient tolerance:  Patient tolerated the procedure well with no immediate complications  54ME sterile blood drawn from clean stick right arm and injected into epidural space. Pt w/some pain on injection but able to tolerate all 20 cc of blood.

## 2017-03-17 NOTE — DISCHARGE INSTRUCTIONS
POST DELIVERY DISCHARGE INSTRUCTIONS    Name: Izaiah Matias  YOB: 1985  Primary Diagnosis: Active Problems:    Pregnancy (3/14/2017)        General:     Diet/Diet Restrictions:  Eight 8-ounce glasses of fluid daily (water, juices); avoid excessive caffeine intake. Meals/snacks as desired which are high in fiber and carbohydrates and low in fat and cholesterol. Physical Activity / Restrictions / Safety:     Avoid heavy lifting, no more that 8 lbs. For 2-3 weeks; No driving while taking narcotic pain medication. Post  patients should not drive until pain free. No intercourse 4-6 weeks, no douching or tampon use. May resume exercise in 6 weeks. Discharge Instructions/Special Treatment/Home Care Needs:     Continue prenatal vitamins. Continue to use squirt bottle with warm water on your episiotomy after each bathroom use until bleeding stops. If steri-strips applied to your incision, remove in 7 days. Take stool softeners daily. Call your doctor for the following:     Fever over 101 degrees by mouth. Vaginal bleeding heavier than a normal menstrual period or lost larger than a golf ball. Red streaks or increased swelling of legs, painful red streaks on your breast.  Painful urination, or increased pain, redness or discharge with your incision. Pain Management:     Pain Management:   Take Acetaminophen (Tylenol) or Ibuprofen (Advil, Motrin), as directed for pain. Use a warm Sitz bath 3 times daily to relieve episiotomy or hemorrhoidal discomfort. Heating pad to  incision as needed. For hemorrhoidal discomfort, use Tucks and Anusol cream as needed and directed.     Follow-Up Care:     Appointment with MD:   Follow-up Appointments   Procedures    FOLLOW UP VISIT Appointment in: 6 Weeks     Standing Status:   Standing     Number of Occurrences:   1     Order Specific Question:   Appointment in     Answer:   Danny Serrano     Telephone number: [phone]    Signed By: Eric Pena Kimmy Villanueva MD                                                                                                   Date: 3/15/2017 Time: 8:41 AM

## 2017-03-17 NOTE — PROGRESS NOTES
Pt continues to c/o constant headache while sitting up. Rating 6:10 with minimal relief from pain meds. Completely laying flat headache dissipates and returns upon sitting up.     Bedside report given to Tono Adams RN

## 2017-03-17 NOTE — Clinical Note
Vital signs are within acceptable limits and stable, SBAR handoff/transfer of care to RN. Patient Handoff Status Level of Response: -- 
O2 Device: -- 
Status: -- 
BP: -- 
Pulse (Heart Rate): -- 
O2 Sat (%): -- 
Resp Rate: -- 
Temp: -- 
Temp Source: --

## 2017-03-17 NOTE — LACTATION NOTE
Mother resting in room pumping. Reviewed with parents pumping, storage, and discharge instructions. Printed material given for storage of EBM. Chart shows numerous feedings, void, stool WNL. Discussed importance of monitoring outputs and feedings on first week of life. Discussed ways to tell if baby is  getting enough breast milk, ie  voids and stools, change in color of stool, and return to birth wt within 2 weeks. Follow up with pediatrician visit for weight check in 1-2 days (per AAP guidelines.)  Encouraged to call [facility]  [phone] or [facility] at [phone] for any questions/problems that arise. Mother also given breastfeeding support group dates and times for any future needs    Engorgement Care Guidelines:  Reviewed how milk is made and normal phases of milk production. Taught care of engorged breasts - frequent breastfeeding encouraged, cool packs and motrin as tolerated. Anticipatory guidance shared. Pt will successfully establish breastfeeding by feeding in response to early feeding cues   or wake every 3h, will obtain deep latch, and will keep log of feedings/output. Taught to BF at hunger cues and or q 2-3 hrs and to offer 10-20 drops of hand expressed colostrum at any non-feeds. Breast Assessment  Left Breast: Medium, Large  Left Nipple: Inverted, Intact, Flat  Right Breast: Medium, Extra large  Right Nipple:  Inverted, Flat, Intact  Breast- Feeding Assessment  Attends Breast-Feeding Classes: Yes  Breast-Feeding Experience: No  Breast Trauma/Surgery: No  Type/Quality:  (mother pumping and feeding EBM and formula)  Lactation Consultant Visits  Breast-Feedings: Not breast-feeding  Mother/Infant Observation  Mother Observation: Breast comfortable  Infant Observation: Rhythmic suck

## 2017-03-17 NOTE — ANESTHESIA PREPROCEDURE EVALUATION
Anesthetic History   No history of anesthetic complications            Review of Systems / Medical History  Patient summary reviewed, nursing notes reviewed and pertinent labs reviewed    Pulmonary            Asthma        Neuro/Psych   Within defined limits           Cardiovascular  Within defined limits                Exercise tolerance: >4 METS     GI/Hepatic/Renal  Within defined limits              Endo/Other        Obesity     Other Findings              Physical Exam    Airway  Mallampati: II    Neck ROM: normal range of motion   Mouth opening: Normal     Cardiovascular  Regular rate and rhythm,  S1 and S2 normal,  no murmur, click, rub, or gallop  Rhythm: regular  Rate: normal         Dental  No notable dental hx       Pulmonary  Breath sounds clear to auscultation               Abdominal  GI exam deferred       Other Findings            Anesthetic Plan    ASA: 2  Anesthesia type: epidural            Anesthetic plan and risks discussed with: Patient      Pt with persistent positional headache following epidural & C/S; epidural blood patch to be performed today

## 2017-03-17 NOTE — PROGRESS NOTES
Transported patient back to room 313. Had previously called report to Clarita, [facility]. Patient tolerated procedure well. VSS. Patient stated that she had some pressure in her neck and that she felt like she needed to eat and drink something. Did give patient a pepsi to sip on on the way back to her room. Tolerated well. Vital signs after procedure:  0935 138/72, 78, 98% RA  0940 142/72, 85, 98% RA  0945 130/63, 81, 98% RA  1000  132/69, 85, 98% RA  1015 Stood patient up at side of bed. Tolerated fine. Stated she felt better. Just slight tightness in neck. Dr Mona Carrillo at bedside. 1030 Transferred patient back to room 313. Clarita RN in room to receive update and patient.

## 2017-03-17 NOTE — PROGRESS NOTES
Post-Partum Progress Note    Patient doing well post-partum without significant complaint. She is voiding without difficulty, she reports normal lochia. Her pain is well controlled with oral pain medication. She is tolerating a general diet. S/p blood patch, feels better, wants to go home. Delivery information:  Information for the patient's :  Cecil Walls, Male [[id number]]   Delivery of a 7 lb 6.2 oz (3.35 kg) male infant via , Low Transverse on 3/15/2017 at 2:18 AM  by . Apgars were 9 and 9. Vitals:  Patient Vitals for the past 8 hrs:   BP Temp Pulse Resp   17 0623 136/79 98.4 °F (36.9 °C) 88 16     Temp (24hrs), Av.4 °F (36.9 °C), Min:98.2 °F (36.8 °C), Max:98.6 °F (37 °C)    Visit Vitals    /79 (BP 1 Location: Right arm, BP Patient Position: At rest)    Pulse 88    Temp 98.4 °F (36.9 °C)    Resp 16    Ht 5' 5\" (1.651 m)    Wt 263 lb (119.3 kg)    SpO2 96%    Breastfeeding Unknown    BMI 43.77 kg/m2       Exam:    General: Patient without distress. Abdomen: soft, fundus firm at level of umbilicus, nontender  Lower extremities: negative for cords or tenderness. Labs: No results found for this or any previous visit (from the past 24 hour(s)). Assessment:    1. Postpartum S/P spontaneous vaginal delivery   2. Patient doing well without significant complications    Plan:  1. Continue routine postpartum care  2. Routine perineal care and maternal education   3.  Oral pain medications and bowel regimen as needed                John Leal MD

## 2017-03-17 NOTE — ROUTINE PROCESS
Patient off unit in stable condition via wheelchair with volunteer for discharge home per Dr. Shannon Vanegas. Pt is to follow-up in 6 weeks and is aware. Prescriptions given to patient. Patient denies any HA, Dizziness, N/V or pain at this time. Infant placed in car seat by mother. Discharge teaching completed and discharge instructions signed and given to patients without any further questions at this time.

## 2017-03-27 ENCOUNTER — TELEPHONE (OUTPATIENT)
Dept: OBGYN CLINIC | Age: 32
End: 2017-03-27

## 2017-03-27 NOTE — TELEPHONE ENCOUNTER
Call received at 2:19pm      33year old patient delivered by emergency  on 3/15/17. Patient reports spinal headaches and receiving the blood patch prior to leaving the hospital. Patient calling to say that she continues to have  2 headaches a day and that the left side of her face is numb. Patient reports the left eye, left eyebrow and her lip she can feel the numbness. Patient reports her smile only turns up on the right side. Patient reports the numbness started 3/18/or 3/19 when she got home.  Please advise

## 2017-03-28 NOTE — PROGRESS NOTES
Pt called anesthesia office today (3/28/17) regarding continued headache and sensation/motor changes to the left side of her face. A blood patch was performed without apparent complications 10 days ago now. She states that her headache was significantly improved immediately after the blood patch but a mild headache returned along with some facial drooping and sensation changes to the left side of her face and tongue a day or two after the blood patch. There has not been any improvement in these symptoms in the last 10 days. I informed the patient that she needs to seek medical attention for these symptoms as soon as possible either at the emergency room or, given the fact that her symptoms are 8days old now, at her primary care physician. I do not think these symptoms are necessarily related to the epidural or blood patch and am concerned about stroke which needs to be checked out first and as soon as possible. Advised patient that if she cannot see her primary care provider within the next day or two she should go to the ER for these symptoms. Will follow up with the patient by phone after she has had a chance to get these symptoms checked out.       Jamie Arteaga MD

## 2017-04-25 ENCOUNTER — OFFICE VISIT (OUTPATIENT)
Dept: OBGYN CLINIC | Age: 32
End: 2017-04-25

## 2017-04-25 VITALS
SYSTOLIC BLOOD PRESSURE: 122 MMHG | HEIGHT: 65 IN | WEIGHT: 243 LBS | BODY MASS INDEX: 40.48 KG/M2 | DIASTOLIC BLOOD PRESSURE: 78 MMHG

## 2017-04-25 DIAGNOSIS — Z01.419 WELL FEMALE EXAM WITH ROUTINE GYNECOLOGICAL EXAM: Primary | ICD-10-CM

## 2017-04-25 RX ORDER — DROSPIRENONE AND ETHINYL ESTRADIOL 0.02-3(28)
1 KIT ORAL DAILY
Qty: 3 PACKAGE | Refills: 4 | Status: SHIPPED | OUTPATIENT
Start: 2017-04-25 | End: 2021-06-04

## 2017-04-25 NOTE — MR AVS SNAPSHOT
Visit Information Date & Time Provider Department Dept. Phone Encounter #  
 4/25/2017  1:10 PM Vanessa Diane MD [facility] [phone] [id number] Upcoming Health Maintenance Date Due  
 PAP AKA CERVICAL CYTOLOGY 4/13/2015 INFLUENZA AGE 9 TO ADULT 8/1/2016 Allergies as of 4/25/2017  Review Complete On: 4/25/2017 By: Rachel Babcock Severity Noted Reaction Type Reactions Aspirin  02/07/2015    Hives Current Immunizations  Never Reviewed Name Date Tdap 12/20/2016 Not reviewed this visit You Were Diagnosed With   
  
 Codes Comments Well female exam with routine gynecological exam    -  Primary ICD-10-CM: I29.202 ICD-9-CM: V72.31 Vitals BP Height(growth percentile) Weight(growth percentile) LMP Breastfeeding? BMI  
 122/78 5' 5\" (1.651 m) 243 lb (110.2 kg) 06/13/2016 (Exact Date) No 40.44 kg/m2 OB Status Smoking Status Having regular periods Never Smoker BMI and BSA Data Body Mass Index Body Surface Area 40.44 kg/m 2 2.25 m 2 Preferred Pharmacy Pharmacy Name Phone [facility] [phone] Your Updated Medication List  
  
   
This list is accurate as of: 4/25/17  1:45 PM.  Always use your most recent med list.  
  
  
  
  
 albuterol 90 mcg/actuation inhaler Commonly known as:  PROVENTIL HFA, VENTOLIN HFA, PROAIR HFA Take 2 Puffs by inhalation every four (4) hours as needed for Wheezing. AMBULATORY BREAST PUMP Use for normal postpartum lactation  
  
 levoFLOXacin 500 mg tablet Commonly known as:  Donal Waqass Take 500 mg by mouth daily. Indications: sinus infection  
  
 oxyCODONE-acetaminophen 5-325 mg per tablet Commonly known as:  PERCOCET Take 1 Tab by mouth every four (4) hours as needed. Max Daily Amount: 6 Tabs. PRENATAL DHA+COMPLETE PRENATAL -300 mg-mcg-mg Cmpk Generic drug:  PNV no.24-iron-folic acid-dha Take  by mouth. [facility] (28) 0.25-35 mg-mcg Tab Generic drug:  norgestimate-ethinyl estradiol Take 1 Tab by mouth daily. valACYclovir 1 gram tablet Commonly known as:  VALTREX Take 1 Tab by mouth daily. Introducing [facility]! Dear Angelito Adhikari: Thank you for requesting a [employer] account. Our records indicate that you already have an active [employer] account. You can access your account anytime at [email or web address] Did you know that you can access your hospital and ER discharge instructions at any time in [employer]? You can also review all of your test results from your hospital stay or ER visit. Additional Information If you have questions, please visit the Frequently Asked Questions section of the [employer] website at [email or web address] Remember, [employer] is NOT to be used for urgent needs. For medical emergencies, dial 911. Now available from your iPhone and Android! Please provide this summary of care documentation to your next provider. Your primary care clinician is listed as Reyna Carpenter. If you have any questions after today's visit, please call [phone].

## 2017-04-25 NOTE — PROGRESS NOTES
Postpartum evaluation    Julio Berry is a 27 y.o. female who presents for a postpartum exam.     She is now six weeks post primary  section. Her baby is doing well. She has had no menses since delivery. She has had the following significant problems since her delivery: none    The patient is bottle feeding without difficulty. The patient would like to use IUD for birth control. She is currently taking: no medications. She is due for her next AE in 12 months. Pap today.      Visit Vitals    /78    Ht 5' 5\" (1.651 m)    Wt 243 lb (110.2 kg)    LMP 2016 (Exact Date)    Breastfeeding No    BMI 40.44 kg/m2       PHYSICAL EXAMINATION    Constitutional  · Appearance: well-nourished, well developed, alert, in no acute distress    HENT  · Head and Face: appears normal    Neck  · Inspection/Palpation: normal appearance, no masses or tenderness  · Lymph Nodes: no lymphadenopathy present  · Thyroid: gland size normal, nontender, no nodules or masses present on palpation    Gastrointestinal  · Abdominal Examination: abdomen non-tender to palpation, normal bowel sounds, no masses present  · Liver and spleen: no hepatomegaly present, spleen not palpable  · Hernias: no hernias identified    Genitourinary  · External Genitalia: normal appearance for age, no discharge present, no tenderness present, no inflammatory lesions present, no masses present, no atrophy present  · Vagina: normal vaginal vault without central or paravaginal defects, no discharge present, no inflammatory lesions present, no masses present  · Bladder: non-tender to palpation  · Urethra: appears normal  · Cervix: normal   · Uterus: normal size, shape and consistency  · Adnexa: no adnexal tenderness present, no adnexal masses present  · Perineum: perineum within normal limits, no evidence of trauma, no rashes or skin lesions present  · Anus: anus within normal limits, no hemorrhoids present  · Inguinal Lymph Nodes: no lymphadenopathy present    Skin  · General Inspection: no rash, no lesions identified    Neurologic/Psychiatric  · Mental Status:  · Orientation: grossly oriented to person, place and time  · Mood and Affect: mood normal, affect appropriate    Assessment:  Normal postpartum check    Plan:  RTO for AE.

## 2017-04-27 LAB
CYTOLOGIST CVX/VAG CYTO: NORMAL
CYTOLOGY CVX/VAG DOC THIN PREP: NORMAL
DX ICD CODE: NORMAL
HPV I/H RISK 1 DNA CVX QL PROBE+SIG AMP: NEGATIVE
Lab: NORMAL
OTHER STN SPEC: NORMAL
PATH REPORT.FINAL DX SPEC: NORMAL
STAT OF ADQ CVX/VAG CYTO-IMP: NORMAL

## 2017-05-16 ENCOUNTER — TELEPHONE (OUTPATIENT)
Dept: OBGYN CLINIC | Age: 32
End: 2017-05-16

## 2017-05-16 NOTE — TELEPHONE ENCOUNTER
Patient calling requesting an appointment for an IUD insertion but states that she is still having incision tenderness at times and looks like her \"stretch marks\" are swollen and tender to the touch. Patient's LMP was 05/12/2017 and day 5 out of 6 day cycle. Patient wanted to know if she could have the IUD inserted with the incisional pain or should she wait until next cycle and come in for a problem appt. Please advise.

## 2017-05-17 ENCOUNTER — OFFICE VISIT (OUTPATIENT)
Dept: OBGYN CLINIC | Age: 32
End: 2017-05-17

## 2017-05-17 DIAGNOSIS — Z30.430 ENCOUNTER FOR IUD INSERTION: Primary | ICD-10-CM

## 2017-05-17 LAB
HCG URINE, QL. (POC): NEGATIVE
VALID INTERNAL CONTROL?: YES

## 2017-05-17 RX ORDER — NYSTATIN AND TRIAMCINOLONE ACETONIDE 100000; 1 [USP'U]/G; MG/G
OINTMENT TOPICAL 2 TIMES DAILY
Qty: 30 G | Refills: 0 | Status: SHIPPED | OUTPATIENT
Start: 2017-05-17 | End: 2017-05-24

## 2017-05-17 NOTE — PROGRESS NOTES
[facility]  OFFICE PROCEDURE PROGRESS NOTE        Chart reviewed for the following:   Wai REEVES, have reviewed the History, Physical and updated the Allergic reactions for [address] performed immediately prior to start of procedure:   Wai REEVES, have performed the following reviews on [employer] prior to the start of the procedure:            * Patient was identified by name and date of birth   * Agreement on procedure being performed was verified  * Risks and Benefits explained to the patient  * Procedure site verified and marked as necessary  * Patient was positioned for comfort  * Consent was signed and verified     Time: 207      Date of procedure: 2017    Procedure performed by:  Mk Cook MD    Provider assisted by: Araceli Grey MA    Patient assisted by: self    How tolerated by patient: tolerated the procedure well with no complications    Post Procedural Pain Scale: 0 - No Hurt    Comments: none      Mirena IUD INSERTION  Indications:  [employer] is a ,  27 y.o. female [address] No LMP recorded. Her LMP was normal in duration and amount of flow. She  presents for insertion of an IUD. The risks, benefits and alternatives of IUD insertion were discussed in detail at last visit. She also has reviewed Mirena information. She has elected to proceed with the insertion today and she states she has no further questions. A urine pregnancy test was negative No components found for: SPEP, UPEP  Procedure: The pelvic exam revealed normal external genitalia. On bimanual exam the uterus was anteverted and normal in size with no tenderness present. A speculum was inserted into the vagina and the cervix was visualized. The cervix was prepped with zephiran solution. The anterior lip of the cervix was grasped with a single toothed tenaculum. The uterus was sounded with a Hummel sound to 7 centimeters.  A Mirena was then inserted without difficulty. The string was cut to 3 centimeters. She experienced a mild  amount of cramping. Post Procedure Status:   She tolerated the procedure with mild discomfort. The patient was observed for 10 minutes after the insertion. There were no complications. Patient was discharged in stable condition. The patient received Mirena lot number [id number].

## 2017-05-17 NOTE — PATIENT INSTRUCTIONS
Intrauterine Device (IUD) Insertion: Care Instructions  Your Care Instructions    The intrauterine device (IUD) is a very effective method of birth control. It is a small, plastic, T-shaped device that contains copper or hormones. The doctor inserts the IUD into your uterus. A plastic string tied to the end of the IUD hangs down through the cervix into the vagina. There are two types of IUDs. The copper IUD is effective for up to 10 years. The hormonal IUD is effective for either 3 years or 5 years, depending on which IUD is used. The hormonal IUD also reduces menstrual bleeding and cramping. Both types of IUD damage or kill the man's sperm. This means that the woman's egg does not join with the sperm. IUDs also change the lining of the uterus so that the egg does not lodge there. The IUD is most likely to work well for women who have been pregnant before. Some women who have never been pregnant have more trouble keeping the IUD in the uterus. They also may have more pain and cramping after insertion. Follow-up care is a key part of your treatment and safety. Be sure to make and go to all appointments, and call your doctor if you are having problems. It's also a good idea to know your test results and keep a list of the medicines you take. How can you care for yourself at home? · You may experience some mild cramping and light bleeding (spotting) for 1 or 2 days. Use a hot water bottle or a heating pad set on low on your belly for pain. · Take an over-the-counter pain medicine, such as acetaminophen (Tylenol), ibuprofen (Advil, Motrin), and naproxen (Aleve) if needed. Read and follow all instructions on the label. · Do not take two or more pain medicines at the same time unless the doctor told you to. Many pain medicines have acetaminophen, which is Tylenol. Too much acetaminophen (Tylenol) can be harmful. · Check the string of your IUD after every period.  To do this, insert a finger into your vagina and feel for the cervix, which is at the top of the vagina and feels harder than the rest of your vagina. You should be able to feel the thin, plastic string coming out of the opening of your cervix. If you cannot feel the string, use another form of birth control and make an appointment with your doctor to have the string checked. · If the IUD comes out, save it and call your doctor. Be sure to use another form of birth control while the IUD is out. · Use latex condoms to protect against sexually transmitted infections (STIs), such as gonorrhea and chlamydia. An IUD does not protect you from STIs. Having one sex partner (who does not have STIs and does not have sex with anyone else) is a good way to avoid STIs. When should you call for help? Call 911 anytime you think you may need emergency care. For example, call if:  · You passed out (lost consciousness). · You have sudden, severe pain in your belly or pelvis. Call your doctor now or seek immediate medical care if:  · You have new belly or pelvic pain. · You have severe vaginal bleeding. This means that you are soaking through your usual pads or tampons each hour for 2 or more hours. · You are dizzy or lightheaded, or you feel like you may faint. · You have a fever and pelvic pain or vaginal discharge. · You have pelvic pain that is getting worse. Watch closely for changes in your health, and be sure to contact your doctor if:  · You cannot feel the string, or the IUD comes out. · You feel sick to your stomach, or you vomit. · You think you may be pregnant. Where can you learn more? Go to http://michael-janelle.info/. Enter R124 in the search box to learn more about \"Intrauterine Device (IUD) Insertion: Care Instructions. \"  Current as of: May 30, 2016  Content Version: 11.2  © 9794-2107 [employer].  Care instructions adapted under license by [employer] (which disclaims liability or warranty for this information). If you have questions about a medical condition or this instruction, always ask your healthcare professional. [place] [address] any warranty or liability for your use of this information.

## 2017-05-17 NOTE — MR AVS SNAPSHOT
Visit Information Date & Time Provider Department Dept. Phone Encounter #  
 5/17/2017  2:00 PM Mk Cook MD [facility] [id number] Upcoming Health Maintenance Date Due INFLUENZA AGE 9 TO ADULT 8/1/2017 PAP AKA CERVICAL CYTOLOGY 4/25/2020 Allergies as of 5/17/2017  Review Complete On: 5/17/2017 By: Wai Gonzalez Severity Noted Reaction Type Reactions Aspirin  02/07/2015    Hives Current Immunizations  Never Reviewed Name Date Tdap 12/20/2016 Not reviewed this visit You Were Diagnosed With   
  
 Codes Comments Encounter for IUD insertion    -  Primary ICD-10-CM: Z30.430 ICD-9-CM: V25.11 Vitals Breastfeeding? OB Status Smoking Status Unknown Having regular periods Never Smoker Preferred Pharmacy Pharmacy Name Phone [facility] [phone] Your Updated Medication List  
  
   
This list is accurate as of: 5/17/17  2:15 PM.  Always use your most recent med list.  
  
  
  
  
 albuterol 90 mcg/actuation inhaler Commonly known as:  PROVENTIL HFA, VENTOLIN HFA, PROAIR HFA Take 2 Puffs by inhalation every four (4) hours as needed for Wheezing. AMBULATORY BREAST PUMP Use for normal postpartum lactation  
  
 drospirenone-ethinyl estradiol 3-0.02 mg Tab Commonly known as:  MATEO Take 1 Tab by mouth daily. levoFLOXacin 500 mg tablet Commonly known as:  Rafael Belling Take 500 mg by mouth daily. Indications: sinus infection  
  
 oxyCODONE-acetaminophen 5-325 mg per tablet Commonly known as:  PERCOCET Take 1 Tab by mouth every four (4) hours as needed. Max Daily Amount: 6 Tabs. PRENATAL DHA+COMPLETE PRENATAL -300 mg-mcg-mg Cmpk Generic drug:  PNV no.24-iron-folic acid-dha Take  by mouth. valACYclovir 1 gram tablet Commonly known as:  VALTREX Take 1 Tab by mouth daily. We Performed the Following AMB POC URINE PREGNANCY TEST, VISUAL COLOR COMPARISON [83635 CPT(R)] [place] MIRENA, 52 MG [id number] [facility]] Patient Instructions Intrauterine Device (IUD) Insertion: Care Instructions Your Care Instructions The intrauterine device (IUD) is a very effective method of birth control. It is a small, plastic, T-shaped device that contains copper or hormones. The doctor inserts the IUD into your uterus. A plastic string tied to the end of the IUD hangs down through the cervix into the vagina. There are two types of IUDs. The copper IUD is effective for up to 10 years. The hormonal IUD is effective for either 3 years or 5 years, depending on which IUD is used. The hormonal IUD also reduces menstrual bleeding and cramping. Both types of IUD damage or kill the man's sperm. This means that the woman's egg does not join with the sperm. IUDs also change the lining of the uterus so that the egg does not lodge there. The IUD is most likely to work well for women who have been pregnant before. Some women who have never been pregnant have more trouble keeping the IUD in the uterus. They also may have more pain and cramping after insertion. Follow-up care is a key part of your treatment and safety. Be sure to make and go to all appointments, and call your doctor if you are having problems. It's also a good idea to know your test results and keep a list of the medicines you take. How can you care for yourself at home? · You may experience some mild cramping and light bleeding (spotting) for 1 or 2 days. Use a hot water bottle or a heating pad set on low on your belly for pain. · Take an over-the-counter pain medicine, such as acetaminophen (Tylenol), ibuprofen (Advil, Motrin), and naproxen (Aleve) if needed. Read and follow all instructions on the label. · Do not take two or more pain medicines at the same time unless the doctor told you to. Many pain medicines have acetaminophen, which is Tylenol. Too much acetaminophen (Tylenol) can be harmful. · Check the string of your IUD after every period. To do this, insert a finger into your vagina and feel for the cervix, which is at the top of the vagina and feels harder than the rest of your vagina. You should be able to feel the thin, plastic string coming out of the opening of your cervix. If you cannot feel the string, use another form of birth control and make an appointment with your doctor to have the string checked. · If the IUD comes out, save it and call your doctor. Be sure to use another form of birth control while the IUD is out. · Use latex condoms to protect against sexually transmitted infections (STIs), such as gonorrhea and chlamydia. An IUD does not protect you from STIs. Having one sex partner (who does not have STIs and does not have sex with anyone else) is a good way to avoid STIs. When should you call for help? Call 911 anytime you think you may need emergency care. For example, call if: 
· You passed out (lost consciousness). · You have sudden, severe pain in your belly or pelvis. Call your doctor now or seek immediate medical care if: 
· You have new belly or pelvic pain. · You have severe vaginal bleeding. This means that you are soaking through your usual pads or tampons each hour for 2 or more hours. · You are dizzy or lightheaded, or you feel like you may faint. · You have a fever and pelvic pain or vaginal discharge. · You have pelvic pain that is getting worse. Watch closely for changes in your health, and be sure to contact your doctor if: 
· You cannot feel the string, or the IUD comes out. · You feel sick to your stomach, or you vomit. · You think you may be pregnant. Where can you learn more? Go to http://michael-janelle.info/. Enter B273 in the search box to learn more about \"Intrauterine Device (IUD) Insertion: Care Instructions. \" Current as of: May 30, 2016 Content Version: 11.2 © 0944-8052 "[employer]. Care instructions adapted under license by [employer] (which disclaims liability or warranty for this information). If you have questions about a medical condition or this instruction, always ask your healthcare professional. [address] any warranty or liability for your use of this information. Introducing [facility] [place] [facility]! Dear Diomedes Mckeon: Thank you for requesting a Fusemachines account. Our records indicate that you already have an active F[employer]emachines account. You can access your account anytime at [email or web address] Did you know that you can access your hospital and ER discharge instructions at any time in Fusemachines? You can also review all of your test results from your hospital stay or ER visit. Additional Information If you have questions, please visit the Frequently Asked Questions section of the F[employer] website at [email or web address] Remember, Fusemachines is NOT to be used for urgent needs. For medical emergencies, dial 911. Now available from your iPhone and Android! Please provide this summary of care documentation to your next provider. Your primary care clinician is listed as Erik Moran. If you have any questions after today's visit, please call [phone].

## 2017-06-06 ENCOUNTER — OFFICE VISIT (OUTPATIENT)
Dept: OBGYN CLINIC | Age: 32
End: 2017-06-06

## 2017-06-06 DIAGNOSIS — Z30.431 IUD CHECK UP: Primary | ICD-10-CM

## 2017-06-06 DIAGNOSIS — R11.0 NAUSEA: ICD-10-CM

## 2017-06-06 LAB
HCG URINE, QL. (POC): NEGATIVE
VALID INTERNAL CONTROL?: YES

## 2017-06-06 NOTE — PROGRESS NOTES
IUD followup note    This is a follow-up visit for Thania ACKERMAN [id number],  27 y.o. female [address]. No LMP recorded. .     She had an Mirena IUD placed six weeks ago. Since the IUD placement, the patient has not had any unusual complaints. She has had some mild non-menstrual bleeding. She describes having a light amount of blood-tinged discharge which has occurred off and on since insertion of the Mirena. She has not significant generalized pain. She has had no fever. Associated signs and symptoms: she denies dyspareunia, expulsion, heavy bleeding, increased pain, fever, and pelvic pain. Ultrasound was not done today. She reports nausea today- requesting a UPT    Past Medical History:   Diagnosis Date    Abnormal Papanicolaou smear of cervix 2004    Asthma     has not used inhaler years    Bronchitis     DIONI III (cervical intraepithelial neoplasia grade III) with severe dysplasia     Cold sore     history of oral cold sores     Past Surgical History:   Procedure Laterality Date    HX GYN       HX LEEP PROCEDURE   DIONI III     Social History     Occupational History    Not on file. Social History Main Topics    Smoking status: Never Smoker    Smokeless tobacco: Never Used    Alcohol use No    Drug use: No    Sexual activity: Yes     Partners: Male     Birth control/ protection: Condom     Family History   Problem Relation Age of Onset    No Known Problems Mother        Allergies   Allergen Reactions    Aspirin Hives     Prior to Admission medications    Medication Sig Start Date End Date Taking? Authorizing Provider   drospirenone-ethinyl estradiol (MATEO) 3-0.02 mg tab Take 1 Tab by mouth daily. 17   Omar Mercedes MD   oxyCODONE-acetaminophen (PERCOCET) 5-325 mg per tablet Take 1 Tab by mouth every four (4) hours as needed. Max Daily Amount: 6 Tabs.  3/15/17   Omar Mercedes MD   PNV no.57-dskt-kqucu acid-dha (PRENATAL DHA+COMPLETE PRENATAL) -300 mg-mcg-mg cmpk Take  by mouth. Historical Provider   valACYclovir (VALTREX) 1 gram tablet Take 1 Tab by mouth daily. 3/2/17   Jil Moise MD   AMBULATORY BREAST PUMP Use for normal postpartum lactation 10/18/16   Jil Moise MD   levofloxacin (LEVAQUIN) 500 mg tablet Take 500 mg by mouth daily. Indications: sinus infection    Miguelangel Paz MD   albuterol (PROVENTIL HFA, VENTOLIN HFA, PROAIR HFA) 90 mcg/actuation inhaler Take 2 Puffs by inhalation every four (4) hours as needed for Wheezing.     Miguelangel Paz MD        Review of Systems: History obtained from the patient  Constitutional: negative for weight loss, fever, night sweats  Breast: negative for breast lumps, nipple discharge, galactorrhea  GI: negative for change in bowel habits, abdominal pain, black or bloody stools  : negative for frequency, dysuria, hematuria, vaginal discharge  MSK: negative for back pain, joint pain, muscle pain  Skin: negative for itching, rash, hives  Psych: negative for anxiety, depression, change in mood      Objective:  Visit Vitals    Breastfeeding Unknown       Physical Exam:   PHYSICAL EXAMINATION    Constitutional  · Appearance: well-nourished, well developed, alert, in no acute distress    Gastrointestinal  · Abdominal Examination: abdomen non-tender to palpation, normal bowel sounds, no masses present  · Liver and spleen: no hepatomegaly present, spleen not palpable  · Hernias: no hernias identified    Genitourinary  · External Genitalia: normal appearance for age, no discharge present, no tenderness present, no inflammatory lesions present, no masses present, no atrophy present  · Vagina: normal vaginal vault without central or paravaginal defects, no discharge present, no inflammatory lesions present, no masses present  · Bladder: non-tender to palpation  · Urethra: appears normal  · Cervix: normal with IUD string visible and appropriate length   · Uterus: normal size, shape and consistency  · Adnexa: no adnexal tenderness present, no adnexal masses present  · Perineum: perineum within normal limits, no evidence of trauma, no rashes or skin lesions present    Skin  · General Inspection: no rash, no lesions identified    Neurologic/Psychiatric  · Mental Status:  · Orientation: grossly oriented to person, place and time  · Mood and Affect: mood normal, affect appropriate    Assessment:  Doing well with expected mild irregular bleeding. If nausea continues then rec she f/u with pcp. Plan:   RTO for AE as scheduled.

## 2017-06-06 NOTE — MR AVS SNAPSHOT
Visit Information Date & Time Provider Department Dept. Phone Encounter #  
 6/6/2017  2:40 PM MD Mike Jackson [phone] [id number] Your Appointments 6/6/2017  2:40 PM  
FOLLOW UP 10 with MD Mike Jackson ([address] [facility]) Appt Note: 3wk iud check FW  
 [address] [facility] [address] [facility] [address]  
[address] [facility] Upcoming Health Maintenance Date Due INFLUENZA AGE 9 TO ADULT 8/1/2017 PAP AKA CERVICAL CYTOLOGY 4/25/2020 Allergies as of 6/6/2017  Review Complete On: 6/6/2017 By: Ricarda Hawley Severity Noted Reaction Type Reactions Aspirin  02/07/2015    Hives Current Immunizations  Never Reviewed Name Date Tdap 12/20/2016 Not reviewed this visit You Were Diagnosed With   
  
 Codes Comments Nausea    -  Primary ICD-10-CM: R11.0 ICD-9-CM: 787.02 Vitals Breastfeeding? OB Status Smoking Status Unknown Having regular periods Never Smoker Preferred Pharmacy Pharmacy Name Phone [phone] [facility] [phone] Your Updated Medication List  
  
   
This list is accurate as of: 6/6/17  2:36 PM.  Always use your most recent med list.  
  
  
  
  
 albuterol 90 mcg/actuation inhaler Commonly known as:  PROVENTIL HFA, VENTOLIN HFA, PROAIR HFA Take 2 Puffs by inhalation every four (4) hours as needed for Wheezing. AMBULATORY BREAST PUMP Use for normal postpartum lactation  
  
 drospirenone-ethinyl estradiol 3-0.02 mg Tab Commonly known as:  MATEO Take 1 Tab by mouth daily. levoFLOXacin 500 mg tablet Commonly known as:  Moshe Prajapati Take 500 mg by mouth daily. Indications: sinus infection  
  
 oxyCODONE-acetaminophen 5-325 mg per tablet Commonly known as:  PERCOCET Take 1 Tab by mouth every four (4) hours as needed. Max Daily Amount: 6 Tabs. PRENATAL DHA+COMPLETE PRENATAL -300 mg-mcg-mg Cmpk Generic drug:  PNV no.24-iron-folic acid-dha Take  by mouth. valACYclovir 1 gram tablet Commonly known as:  VALTREX Take 1 Tab by mouth daily. We Performed the Following AMB POC URINE PREGNANCY TEST, VISUAL COLOR COMPARISON [66080 CPT(R)] Introducing [facility] SERVICES! Dear Huma Craft: Thank you for requesting a Backplane account. Our records indicate that you already have an active Backplane account. You can access your account anytime at [email or web address] Did you know that you can access your hospital and ER discharge instructions at any time in Backplane? You can also review all of your test results from your hospital stay or ER visit. Additional Information If you have questions, please visit the Frequently Asked Questions section of the Backplane website at [email or web address] Remember, Backplane is NOT to be used for urgent needs. For medical emergencies, dial 911. Now available from your iPhone and Android! Please provide this summary of care documentation to your next provider. Your primary care clinician is listed as Gita Pollock. If you have any questions after today's visit, please call [phone].

## 2019-05-07 ENCOUNTER — OFFICE VISIT (OUTPATIENT)
Dept: OBGYN CLINIC | Age: 34
End: 2019-05-07

## 2019-05-07 VITALS
SYSTOLIC BLOOD PRESSURE: 124 MMHG | BODY MASS INDEX: 41.99 KG/M2 | WEIGHT: 252 LBS | DIASTOLIC BLOOD PRESSURE: 80 MMHG | HEIGHT: 65 IN

## 2019-05-07 DIAGNOSIS — Z01.419 WELL FEMALE EXAM WITH ROUTINE GYNECOLOGICAL EXAM: Primary | ICD-10-CM

## 2019-05-07 RX ORDER — HYDROCORTISONE 25 MG/G
CREAM TOPICAL 2 TIMES DAILY
Qty: 30 G | Refills: 11 | Status: SHIPPED | OUTPATIENT
Start: 2019-05-07

## 2019-05-07 NOTE — PATIENT INSTRUCTIONS

## 2019-05-07 NOTE — PROGRESS NOTES
Annual exam ages 21-44    Salazar Junior is a ,  37 y.o. female [address] No LMP recorded. (Menstrual status: IUD). .    She presents for her annual checkup. She is having no significant problems. With regard to the Gardasil vaccine, she is older than the FDA approved age to receive it. Menstrual status:    Her periods are spotting in flow. She is using essentially none pads or tampons per day, minimal to none using Mirena. She denies dysmenorrhea. She reports no premenstrual symptoms. Contraception:    The current method of family planning is IUD. Sexual history:     She  reports that she currently engages in sexual activity and has had partners who are Male. She reports using the following method of birth control/protection: IUD. Candance Huger Medical conditions:    Since her last annual GYN exam about two years ago, she has not the following changes in her health history: none. Pap and Mammogram History:    Her most recent Pap smear was normal, obtained 2 year(s) ago. The patient has never had a mammogram.    Breast Cancer History/Substance Abuse: negative    Past Medical History:   Diagnosis Date    Abnormal Papanicolaou smear of cervix 2004    Asthma     has not used inhaler years    Bronchitis     DIONI III (cervical intraepithelial neoplasia grade III) with severe dysplasia     Cold sore     history of oral cold sores    Encounter for IUD insertion 15 Mirena    Pap smear for cervical cancer screening 15 neg HPV NEG     Past Surgical History:   Procedure Laterality Date    HX GYN       HX LEEP PROCEDURE   DIONI III       Current Outpatient Medications   Medication Sig Dispense Refill    valACYclovir (VALTREX) 1 gram tablet Take 1 Tab by mouth daily. 30 Tab 1    drospirenone-ethinyl estradiol (MATEO) 3-0.02 mg tab Take 1 Tab by mouth daily.  3 Package 4    oxyCODONE-acetaminophen (PERCOCET) 5-325 mg per tablet Take 1 Tab by mouth every four (4) hours as needed. Max Daily Amount: 6 Tabs. 30 Tab 0    PNV no.24-iron-folic acid-dha (PRENATAL DHA+COMPLETE PRENATAL) -300 mg-mcg-mg cmpk Take  by mouth.  AMBULATORY BREAST PUMP Use for normal postpartum lactation 1 Device 0    levofloxacin (LEVAQUIN) 500 mg tablet Take 500 mg by mouth daily. Indications: sinus infection      albuterol (PROVENTIL HFA, VENTOLIN HFA, PROAIR HFA) 90 mcg/actuation inhaler Take 2 Puffs by inhalation every four (4) hours as needed for Wheezing. Allergies: Aspirin     Tobacco History:  reports that she has never smoked. She has never used smokeless tobacco.  Alcohol Abuse:  reports that she does not drink alcohol. Drug Abuse:  reports that she does not use drugs. Family Medical/Cancer History:   Family History   Problem Relation Age of Onset    No Known Problems Mother         Review of Systems - History obtained from the patient  Constitutional: negative for weight loss, fever, night sweats  HEENT: negative for hearing loss, earache, congestion, snoring, sorethroat  CV: negative for chest pain, palpitations, edema  Resp: negative for cough, shortness of breath, wheezing  GI: negative for change in bowel habits, abdominal pain, black or bloody stools  : negative for frequency, dysuria, hematuria, vaginal discharge  MSK: negative for back pain, joint pain, muscle pain  Breast: negative for breast lumps, nipple discharge, galactorrhea  Skin :negative for itching, rash, hives  Neuro: negative for dizziness, headache, confusion, weakness  Psych: negative for anxiety, depression, change in mood  Heme/lymph: negative for bleeding, bruising, pallor    Physical Exam    Visit Vitals  /80   Ht 5' 5\" (1.651 m)   Wt 252 lb (114.3 kg)   Breastfeeding?  No   BMI 41.93 kg/m²       Constitutional  · Appearance: well-nourished, well developed, alert, in no acute distress    HENT  · Head and Face: appears normal    Neck  · Inspection/Palpation: normal appearance, no masses or tenderness  · Lymph Nodes: no lymphadenopathy present  · Thyroid: gland size normal, nontender, no nodules or masses present on palpation    Chest  · Respiratory Effort: breathing unlabored    Breasts  · Inspection of Breasts: breasts symmetrical, no skin changes, no discharge present, nipple appearance normal, no skin retraction present  · Palpation of Breasts and Axillae: no masses present on palpation, no breast tenderness  · Axillary Lymph Nodes: no lymphadenopathy present    Gastrointestinal  · Abdominal Examination: abdomen non-tender to palpation, normal bowel sounds, no masses present  · Liver and spleen: no hepatomegaly present, spleen not palpable  · Hernias: no hernias identified    Genitourinary  · External Genitalia: normal appearance for age, no discharge present, no tenderness present, no inflammatory lesions present, no masses present, no atrophy present  · Vagina: normal vaginal vault without central or paravaginal defects, no discharge present, no inflammatory lesions present, no masses present  · Bladder: non-tender to palpation  · Urethra: appears normal  · Cervix: normal   · Uterus: normal size, shape and consistency  · Adnexa: no adnexal tenderness present, no adnexal masses present  · Perineum: perineum within normal limits, no evidence of trauma, no rashes or skin lesions present  · Anus: anus within normal limits, no hemorrhoids present  · Inguinal Lymph Nodes: no lymphadenopathy present    Skin  · General Inspection: no rash, no lesions identified    Neurologic/Psychiatric  · Mental Status:  · Orientation: grossly oriented to person, place and time  · Mood and Affect: mood normal, affect appropriate    . Assessment:  Routine gynecologic examination  Her current medical status is satisfactory with no evidence of significant gynecologic issues.     Plan:  Counseled re: diet, exercise, healthy lifestyle  Return for yearly wellness visits  Pt counseled regarding co-testing for high risk HPV with pap  Rec screening mammo at either 33 or 38

## 2019-05-10 LAB
CYTOLOGIST CVX/VAG CYTO: NORMAL
CYTOLOGY CVX/VAG DOC CYTO: NORMAL
CYTOLOGY CVX/VAG DOC THIN PREP: NORMAL
DX ICD CODE: NORMAL
HPV I/H RISK 1 DNA CVX QL PROBE+SIG AMP: NEGATIVE
Lab: NORMAL
OTHER STN SPEC: NORMAL
STAT OF ADQ CVX/VAG CYTO-IMP: NORMAL

## 2021-06-04 ENCOUNTER — OFFICE VISIT (OUTPATIENT)
Dept: OBGYN CLINIC | Age: 36
End: 2021-06-04
Payer: COMMERCIAL

## 2021-06-04 VITALS — WEIGHT: 244 LBS | BODY MASS INDEX: 40.6 KG/M2 | DIASTOLIC BLOOD PRESSURE: 63 MMHG | SYSTOLIC BLOOD PRESSURE: 125 MMHG

## 2021-06-04 DIAGNOSIS — Z01.419 WELL WOMAN EXAM WITH ROUTINE GYNECOLOGICAL EXAM: Primary | ICD-10-CM

## 2021-06-04 PROCEDURE — 99395 PREV VISIT EST AGE 18-39: CPT | Performed by: OBSTETRICS & GYNECOLOGY

## 2021-06-04 RX ORDER — ROSUVASTATIN CALCIUM 10 MG/1
10 TABLET, COATED ORAL
COMMUNITY

## 2021-06-04 RX ORDER — NYSTATIN AND TRIAMCINOLONE ACETONIDE 100000; 1 [USP'U]/G; MG/G
CREAM TOPICAL 2 TIMES DAILY
Qty: 30 G | Refills: 5 | Status: SHIPPED | OUTPATIENT
Start: 2021-06-04

## 2021-06-04 RX ORDER — VALACYCLOVIR HYDROCHLORIDE 1 G/1
2000 TABLET, FILM COATED ORAL 2 TIMES DAILY
Qty: 20 TABLET | Refills: 4 | Status: SHIPPED | OUTPATIENT
Start: 2021-06-04 | End: 2021-06-05

## 2021-06-04 NOTE — PROGRESS NOTES
Annual exam ages [age]    Ursula Perez is a ,  43 y.o. female   No LMP recorded. (Menstrual status: IUD). She presents for her annual checkup. She is having no significant problems. Menstrual status:    Her periods are absent in flow due to IUD. Contraception:    The current method of family planning is Hormonal IUS. Sexual history:    She  reports being sexually active and has had partner(s) who are Male. She reports using the following method of birth control/protection: I.U.D..    Medical conditions:    Since her last annual GYN exam about two years ago, she has not the following changes in her health history: none. Surgical history confirmed with patient. has a past surgical history that includes hx leep procedure ( DIONI III) and hx gyn ( ). Pap and Mammogram History:    Her most recent Pap smear was normal, obtained 2 year(s) ago. The patient has never had a mammogram.    Breast Cancer History/Substance Abuse: negative    Past Medical History:   Diagnosis Date    Abnormal Papanicolaou smear of cervix 2004    Asthma     has not used inhaler years    Bronchitis     DIONI III (cervical intraepithelial neoplasia grade III) with severe dysplasia     Cold sore     history of oral cold sores    Encounter for IUD insertion 17 Mirena    Pap smear for cervical cancer screening 17 neg HPV NEG     Past Surgical History:   Procedure Laterality Date    HX GYN       HX LEEP PROCEDURE   DIONI III       Current Outpatient Medications   Medication Sig Dispense Refill    rosuvastatin (Crestor) 10 mg tablet Take 10 mg by mouth nightly.  hydrocortisone (HYTONE) 2.5 % topical cream Apply  to affected area two (2) times a day. use thin layer (Patient not taking: Reported on 2021) 30 g 11    drospirenone-ethinyl estradiol (MATEO) 3-0.02 mg tab Take 1 Tab by mouth daily.  (Patient not taking: Reported on 2021) 3 Package 4    oxyCODONE-acetaminophen (PERCOCET) 5-325 mg per tablet Take 1 Tab by mouth every four (4) hours as needed. Max Daily Amount: 6 Tabs. (Patient not taking: Reported on 6/4/2021) 30 Tab 0    PNV no.24-iron-folic acid-dha (PRENATAL DHA+COMPLETE PRENATAL) -300 mg-mcg-mg cmpk Take  by mouth. (Patient not taking: Reported on 6/4/2021)      valACYclovir (VALTREX) 1 gram tablet Take 1 Tab by mouth daily. (Patient not taking: Reported on 6/4/2021) 30 Tab 1    AMBULATORY BREAST PUMP Use for normal postpartum lactation (Patient not taking: Reported on 6/4/2021) 1 Device 0    levofloxacin (LEVAQUIN) 500 mg tablet Take 500 mg by mouth daily. Indications: sinus infection (Patient not taking: Reported on 6/4/2021)      albuterol (PROVENTIL HFA, VENTOLIN HFA, PROAIR HFA) 90 mcg/actuation inhaler Take 2 Puffs by inhalation every four (4) hours as needed for Wheezing. (Patient not taking: Reported on 6/4/2021)       Allergies: Aspirin     Tobacco History:  reports that she has never smoked. She has never used smokeless tobacco.  Alcohol Abuse:  reports no history of alcohol use. Drug Abuse:  reports no history of drug use.     Family Medical/Cancer History:   Family History   Problem Relation Age of Onset    No Known Problems Mother         Review of Systems - History obtained from the patient  Constitutional: negative for weight loss, fever, night sweats  HEENT: negative for hearing loss, earache, congestion, snoring, sorethroat  CV: negative for chest pain, palpitations, edema  Resp: negative for cough, shortness of breath, wheezing  GI: negative for change in bowel habits, abdominal pain, black or bloody stools  : negative for frequency, dysuria, hematuria, vaginal discharge  MSK: negative for back pain, joint pain, muscle pain  Breast: negative for breast lumps, nipple discharge, galactorrhea  Skin :negative for itching, rash, hives  Neuro: negative for dizziness, headache, confusion, weakness  Psych: negative for anxiety, depression, change in mood  Heme/lymph: negative for bleeding, bruising, pallor    Physical Exam    Visit Vitals  /63   Wt 244 lb (110.7 kg)   BMI 40.60 kg/m²       Constitutional  · Appearance: well-nourished, well developed, alert, in no acute distress    HENT  · Head and Face: appears normal    Neck  · Inspection/Palpation: normal appearance, no masses or tenderness  · Lymph Nodes: no lymphadenopathy present  · Thyroid: gland size normal, nontender, no nodules or masses present on palpation    Chest  · Respiratory Effort: breathing unlabored    Breasts  · Inspection of Breasts: breasts symmetrical, no skin changes, no discharge present, nipple appearance normal, no skin retraction present  · Palpation of Breasts and Axillae: no masses present on palpation, no breast tenderness  · Axillary Lymph Nodes: no lymphadenopathy present    Gastrointestinal  · Abdominal Examination: abdomen non-tender to palpation, normal bowel sounds, no masses present  · Liver and spleen: no hepatomegaly present, spleen not palpable  · Hernias: no hernias identified    Genitourinary  · External Genitalia: normal appearance for age, no discharge present, no tenderness present, no inflammatory lesions present, no masses present, no atrophy present  · Vagina: normal vaginal vault without central or paravaginal defects, no discharge present, no inflammatory lesions present, no masses present  · Bladder: non-tender to palpation  · Urethra: appears normal  · Cervix: normal   · Uterus: normal size, shape and consistency  · Adnexa: no adnexal tenderness present, no adnexal masses present  · Perineum: perineum within normal limits, no evidence of trauma, no rashes or skin lesions present  · Anus: anus within normal limits, no hemorrhoids present  · Inguinal Lymph Nodes: no lymphadenopathy present    Skin  · General Inspection: no rash, no lesions identified    Neurologic/Psychiatric  · Mental Status:  · Orientation: grossly oriented to person, place and time  · Mood and Affect: mood normal, affect appropriate    Assessment:  Routine gynecologic examination  Her current medical status is satisfactory with no evidence of significant gynecologic issues. Yeast in flexural creases- mycolog prescribed.     Plan:  Counseled re: diet, exercise, healthy lifestyle  Return for yearly wellness visits  Pt counseled regarding co-testing for high risk HPV with pap  Rec screening mammo at either 33 or 38

## 2021-06-09 LAB
C TRACH RRNA CVX QL NAA+PROBE: NEGATIVE
CYTOLOGIST CVX/VAG CYTO: NORMAL
CYTOLOGY CVX/VAG DOC CYTO: NORMAL
CYTOLOGY CVX/VAG DOC THIN PREP: NORMAL
CYTOLOGY HISTORY:: NORMAL
DX ICD CODE: NORMAL
HPV I/H RISK 4 DNA CVX QL PROBE+SIG AMP: NEGATIVE
Lab: NORMAL
N GONORRHOEA RRNA CVX QL NAA+PROBE: NEGATIVE
OTHER STN SPEC: NORMAL
STAT OF ADQ CVX/VAG CYTO-IMP: NORMAL
T VAGINALIS RRNA SPEC QL NAA+PROBE: NEGATIVE

## 2021-06-09 NOTE — PROGRESS NOTES
Pt doing well, see prenatal flowsheet. Confirmed no h/o hsv  Physician review of ultrasound performed by technician    Today's ultrasound report and images were reviewed and discussed with the patient.   Please see images and imaging report entered by technician in PACS for more detail and progress
pcp

## 2022-03-19 PROBLEM — Z34.90 PREGNANCY: Status: ACTIVE | Noted: 2017-03-14

## 2022-06-05 NOTE — PROGRESS NOTES
Annual exam ages 21-44    Livan Barker is a ,  42 y.o. female   No LMP recorded. (Menstrual status: IUD). She presents for her annual checkup. She is having no significant problems. Menstrual status:    Her periods are minimal to none using IUD. Contraception:    The current method of family planning is Hormonal IUS. Sexual history:    She  reports being sexually active and has had partner(s) who are male. She reports using the following method of birth control/protection: I.U.D..    Medical conditions:    Since her last annual GYN exam about one year ago, she has not the following changes in her health history: none. Surgical history confirmed with patient. has a past surgical history that includes hx leep procedure ( DIONI III) and hx gyn ( ). Pap and Mammogram History:    Her most recent Pap smear was normal, obtained 1 year(s) ago. The patient has never had a mammogram.    Breast Cancer History/Substance Abuse: negative    Past Medical History:   Diagnosis Date    Abnormal Papanicolaou smear of cervix 2004    Asthma     has not used inhaler years    Bronchitis     DIONI III (cervical intraepithelial neoplasia grade III) with severe dysplasia     Cold sore     history of oral cold sores    Encounter for IUD insertion 17 Mirena    Pap smear for cervical cancer screening 17 neg HPV NEG     Past Surgical History:   Procedure Laterality Date    HX GYN       HX LEEP PROCEDURE   DIONI III       Current Outpatient Medications   Medication Sig Dispense Refill    rosuvastatin (Crestor) 10 mg tablet Take 10 mg by mouth nightly.  nystatin-triamcinolone (MYCOLOG II) topical cream Apply  to affected area two (2) times a day. 30 g 5    hydrocortisone (HYTONE) 2.5 % topical cream Apply  to affected area two (2) times a day.  use thin layer (Patient not taking: Reported on 2021) 30 g 11    AMBULATORY BREAST PUMP Use for normal postpartum lactation (Patient not taking: Reported on 6/4/2021) 1 Device 0    levofloxacin (LEVAQUIN) 500 mg tablet Take 500 mg by mouth daily. Indications: sinus infection (Patient not taking: Reported on 6/4/2021)      albuterol (PROVENTIL HFA, VENTOLIN HFA, PROAIR HFA) 90 mcg/actuation inhaler Take 2 Puffs by inhalation every four (4) hours as needed for Wheezing. (Patient not taking: Reported on 6/4/2021)       Allergies: Aspirin     Tobacco History:  reports that she has never smoked. She has never used smokeless tobacco.  Alcohol Abuse:  reports no history of alcohol use. Drug Abuse:  reports no history of drug use. Family Medical/Cancer History:   Family History   Problem Relation Age of Onset    No Known Problems Mother         Review of Systems - History obtained from the patient  Constitutional: negative for weight loss, fever, night sweats  HEENT: negative for hearing loss, earache, congestion, snoring, sorethroat  CV: negative for chest pain, palpitations, edema  Resp: negative for cough, shortness of breath, wheezing  GI: negative for change in bowel habits, abdominal pain, black or bloody stools  : negative for frequency, dysuria, hematuria, vaginal discharge  MSK: negative for back pain, joint pain, muscle pain  Breast: negative for breast lumps, nipple discharge, galactorrhea  Skin :negative for itching, rash, hives  Neuro: negative for dizziness, headache, confusion, weakness  Psych: negative for anxiety, depression, change in mood  Heme/lymph: negative for bleeding, bruising, pallor    Physical Exam    There were no vitals taken for this visit.     Constitutional  · Appearance: well-nourished, well developed, alert, in no acute distress    HENT  · Head and Face: appears normal    Neck  · Inspection/Palpation: normal appearance, no masses or tenderness  · Lymph Nodes: no lymphadenopathy present  · Thyroid: gland size normal, nontender, no nodules or masses present on palpation    Chest  · Respiratory Effort: breathing unlabored    Breasts  · Inspection of Breasts: breasts symmetrical, no skin changes, no discharge present, nipple appearance normal, no skin retraction present  · Palpation of Breasts and Axillae: no masses present on palpation, no breast tenderness  · Axillary Lymph Nodes: no lymphadenopathy present    Gastrointestinal  · Abdominal Examination: abdomen non-tender to palpation, normal bowel sounds, no masses present  · Liver and spleen: no hepatomegaly present, spleen not palpable  · Hernias: no hernias identified    Genitourinary  · External Genitalia: normal appearance for age, no discharge present, no tenderness present, no inflammatory lesions present, no masses present, no atrophy present  · Vagina: normal vaginal vault without central or paravaginal defects, no discharge present, no inflammatory lesions present, no masses present  · Bladder: non-tender to palpation  · Urethra: appears normal  · Cervix: normal   · Uterus: normal size, shape and consistency  · Adnexa: no adnexal tenderness present, no adnexal masses present  · Perineum: perineum within normal limits, no evidence of trauma, no rashes or skin lesions present  · Anus: anus within normal limits, no hemorrhoids present  · Inguinal Lymph Nodes: no lymphadenopathy present    Skin  · General Inspection: no rash, no lesions identified    Neurologic/Psychiatric  · Mental Status:  · Orientation: grossly oriented to person, place and time  · Mood and Affect: mood normal, affect appropriate    . Assessment:  Routine gynecologic examination  Her current medical status is satisfactory with no evidence of significant gynecologic issues.     Plan:  Counseled re: diet, exercise, healthy lifestyle  Return for yearly wellness visits  Pt counseled regarding co-testing for high risk HPV with pap  Rec screening mammo at either 33 or 38

## 2022-06-06 ENCOUNTER — OFFICE VISIT (OUTPATIENT)
Dept: OBGYN CLINIC | Age: 37
End: 2022-06-06
Payer: COMMERCIAL

## 2022-06-06 VITALS — SYSTOLIC BLOOD PRESSURE: 128 MMHG | BODY MASS INDEX: 41.93 KG/M2 | DIASTOLIC BLOOD PRESSURE: 66 MMHG | WEIGHT: 252 LBS

## 2022-06-06 DIAGNOSIS — Z11.3 SCREENING EXAMINATION FOR SEXUALLY TRANSMITTED DISEASE: ICD-10-CM

## 2022-06-06 DIAGNOSIS — Z01.419 WELL WOMAN EXAM WITH ROUTINE GYNECOLOGICAL EXAM: Primary | ICD-10-CM

## 2022-06-06 PROCEDURE — 99395 PREV VISIT EST AGE 18-39: CPT | Performed by: OBSTETRICS & GYNECOLOGY

## 2022-10-20 ENCOUNTER — HOSPITAL ENCOUNTER (EMERGENCY)
Age: 37
Discharge: HOME | End: 2022-10-20
Attending: EMERGENCY MEDICINE
Payer: COMMERCIAL

## 2022-10-20 VITALS
HEIGHT: 65 IN | BODY MASS INDEX: 42.02 KG/M2 | WEIGHT: 252.21 LBS | SYSTOLIC BLOOD PRESSURE: 122 MMHG | RESPIRATION RATE: 18 BRPM | TEMPERATURE: 98.1 F | HEART RATE: 88 BPM | OXYGEN SATURATION: 98 % | DIASTOLIC BLOOD PRESSURE: 62 MMHG
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
## 2022-10-20 DIAGNOSIS — R51.9 NONINTRACTABLE HEADACHE, UNSPECIFIED CHRONICITY PATTERN, UNSPECIFIED HEADACHE TYPE: Primary | ICD-10-CM

## 2022-10-20 DIAGNOSIS — M43.6 NECK STIFFNESS: ICD-10-CM

## 2022-10-20 PROCEDURE — 99282 EMERGENCY DEPT VISIT SF MDM: CPT

## 2022-10-20 PROCEDURE — 99281 EMR DPT VST MAYX REQ PHY/QHP: CPT
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
## 2022-11-11 PROCEDURE — 99283 EMERGENCY DEPT VISIT LOW MDM: CPT

## 2022-11-12 ENCOUNTER — HOSPITAL ENCOUNTER (EMERGENCY)
Age: 37
Discharge: HOME | End: 2022-11-12
Attending: EMERGENCY MEDICINE
Payer: COMMERCIAL

## 2022-11-12 VITALS
TEMPERATURE: 98.7 F | DIASTOLIC BLOOD PRESSURE: 64 MMHG | HEIGHT: 66 IN | OXYGEN SATURATION: 97 % | SYSTOLIC BLOOD PRESSURE: 113 MMHG | HEART RATE: 87 BPM | BODY MASS INDEX: 39.21 KG/M2 | RESPIRATION RATE: 18 BRPM | WEIGHT: 244 LBS

## 2022-11-12 DIAGNOSIS — M54.50 ACUTE RIGHT-SIDED LOW BACK PAIN WITHOUT SCIATICA: Primary | ICD-10-CM

## 2022-11-12 LAB
APPEARANCE UR: CLEAR
BACTERIA URNS QL MICRO: NEGATIVE /HPF
BILIRUB UR QL: NEGATIVE
COLOR UR: ABNORMAL
EPITH CASTS URNS QL MICRO: ABNORMAL /LPF
GLUCOSE UR STRIP.AUTO-MCNC: NEGATIVE MG/DL
HGB UR QL STRIP: NEGATIVE
HYALINE CASTS URNS QL MICRO: ABNORMAL /LPF
KETONES UR QL STRIP.AUTO: 40 MG/DL
LEUKOCYTE ESTERASE UR QL STRIP.AUTO: NEGATIVE
NITRITE UR QL STRIP.AUTO: NEGATIVE
PH UR STRIP: 6 [PH] (ref 5–8)
PROT UR STRIP-MCNC: NEGATIVE MG/DL
RBC #/AREA URNS HPF: ABNORMAL /HPF
SP GR UR REFRACTOMETRY: 1.02 (ref 1–1.03)
UA: UC IF INDICATED,UAUC: ABNORMAL
UROBILINOGEN UR QL STRIP.AUTO: 0.2 EU/DL (ref 0.2–1)
WBC URNS QL MICRO: ABNORMAL /HPF (ref 0–4)

## 2022-11-12 PROCEDURE — 81001 URINALYSIS AUTO W/SCOPE: CPT

## 2022-11-12 PROCEDURE — 74011250637 HC RX REV CODE- 250/637: Performed by: EMERGENCY MEDICINE

## 2022-11-12 RX ORDER — CYCLOBENZAPRINE HCL 5 MG
5 TABLET ORAL
Qty: 12 TABLET | Refills: 0 | Status: SHIPPED | OUTPATIENT
Start: 2022-11-12

## 2022-11-12 RX ORDER — DIAZEPAM 5 MG/1
5 TABLET ORAL
Status: COMPLETED | OUTPATIENT
Start: 2022-11-12 | End: 2022-11-12

## 2022-11-12 RX ADMIN — DIAZEPAM 5 MG: 5 TABLET ORAL at 00:52

## 2022-11-12 NOTE — DISCHARGE INSTRUCTIONS
You were seen in the emergency department for an. The results of your tests including lab work, were normal.  Although an exact cause of your symptoms was not identified, the most likely cause is a muscle strain. Please take any medications prescribed at this visit as instructed. Please also follow-up with your PCP or return to the emergency department if you experience a worsening of symptoms or any new symptoms that are concerning to you.

## 2022-11-12 NOTE — ED NOTES
The patient was discharged home by Alden Hanson MD in stable condition. The patient is alert and oriented, in no respiratory distress and discharge vital signs obtained. The patient's diagnosis, condition and treatment were explained. The patient expressed understanding. No prescriptions given. No work/school note given. A discharge plan has been developed. A  was not involved in the process. Aftercare instructions were given. Pt ambulatory out of the ED. Pt discharged from the ED family.

## 2022-11-12 NOTE — ED PROVIDER NOTES
73-year-old female with PMHx of asthma presents emergency department complaining of sudden onset, sharp, nonradiating, 8/10 right-sided lower back pain since  tonight. Patient reports that she was putting decorations on a [place] tree at onset of pain and believes she may have twisted or bent over prior to onset. He has no additional complaints at this time    The history is provided by the patient and the spouse. Back Pain   This is a new problem. The current episode started 3 to 5 hours ago. The problem has not changed since onset. The problem occurs constantly. The pain is associated with twisting. The pain is present in the lumbar spine and right side. The quality of the pain is described as stabbing. The pain does not radiate. The pain is at a severity of 7/10. The symptoms are aggravated by twisting and bending. Pertinent negatives include no bladder incontinence, no leg pain, no paresthesias and no weakness. Treatments tried: Acetaminophen. The treatment provided no relief. Risk factors include obesity.       Past Medical History:   Diagnosis Date    Abnormal Papanicolaou smear of cervix 2004    Asthma     has not used inhaler years    Bronchitis     DIONI III (cervical intraepithelial neoplasia grade III) with severe dysplasia     Cold sore     history of oral cold sores    Encounter for IUD insertion 17 Mirena    Pap smear for cervical cancer screening 17 neg HPV NEG       Past Surgical History:   Procedure Laterality Date    HX GYN       HX LEEP PROCEDURE   IDONI III         Family History:   Problem Relation Age of Onset    No Known Problems Mother        Social History     Socioeconomic History    Marital status:      Spouse name: Not on file    Number of children: Not on file    Years of education: Not on file    Highest education level: Not on file   Occupational History    Not on file   Tobacco Use    Smoking status: Never    Smokeless tobacco: Never   Substance and Sexual Activity    Alcohol use: Yes     Comment: Social    Drug use: No    Sexual activity: Yes     Partners: Male     Birth control/protection: I.U.D. Other Topics Concern    Not on file   Social History Narrative    Not on file     Social Determinants of Health     Financial Resource Strain: Not on file   Food Insecurity: Not on file   Transportation Needs: Not on file   Physical Activity: Not on file   Stress: Not on file   Social Connections: Not on file   Intimate Partner Violence: Not on file   Housing Stability: Not on file         ALLERGIES: Aspirin    Review of Systems   Constitutional: Negative. HENT: Negative. Eyes: Negative. Respiratory: Negative. Cardiovascular: Negative. Gastrointestinal: Negative. Endocrine: Negative. Genitourinary: Negative. Negative for bladder incontinence. Musculoskeletal:  Positive for back pain. Skin: Negative. Neurological: Negative. Negative for weakness and paresthesias. Hematological: Negative. Psychiatric/Behavioral: Negative. Vitals:    11/12/22 0007   BP: 113/64   Pulse: 87   Resp: 18   Temp: 98.7 °F (37.1 °C)   SpO2: 99%   Weight: 110.7 kg (244 lb)   Height: 5' 5.5\" (1.664 m)            Physical Exam  Vitals and nursing note reviewed. Constitutional:       General: She is not in acute distress. Appearance: Normal appearance. She is not ill-appearing. HENT:      Head: Normocephalic and atraumatic. Nose: Nose normal.      Mouth/Throat:      Mouth: Mucous membranes are moist.   Eyes:      Extraocular Movements: Extraocular movements intact. Pupils: Pupils are equal, round, and reactive to light. Cardiovascular:      Rate and Rhythm: Normal rate and regular rhythm. Pulses: Normal pulses. Pulmonary:      Effort: Pulmonary effort is normal. No respiratory distress. Breath sounds: Normal breath sounds. Abdominal:      General: There is no distension. Palpations: Abdomen is soft. Tenderness: There is no abdominal tenderness. Musculoskeletal:         General: Tenderness present. Normal range of motion. Cervical back: Normal range of motion and neck supple. Skin:     General: Skin is warm and dry. Neurological:      General: No focal deficit present. Mental Status: She is alert and oriented to person, place, and time. Psychiatric:         Mood and Affect: Mood normal.        MDM  Number of Diagnoses or Management Options  Acute right-sided low back pain without sciatica  Diagnosis management comments: DDx: Muscle strain, lumbar radiculopathy, degenerative disc disease, osteoarthritis. This patient presents with back pain most consistent with muscle strain. The pt is nontoxic appearing with normal vital signs. No back pain red flags on history or physical. Presentation not consistent with malignancy (lack of history of malignancy, lack of B symptoms), fracture (no trauma, no bony tenderness to palpation), cauda equina (no bowel or urinary incontinence/retention, no saddle anesthesia, no distal weakness), AAA, viscus perforation, osteomyelitis or epidural abscess (no IVDU, vertebral tenderness), renal colic, pyelonephritis (afebrile, no CVAT, no urinary symptoms). Given the clinical picture, no indication for imaging at this time. Will provide analgesics and reassess. Plan:  - Labs: Urinalysis  - Medications: Diazepam    Reassessment: Patient reports improvement with the above intervention. Urinalysis without evidence of UTI.   Will discharge at this time with return precautions       Amount and/or Complexity of Data Reviewed  Clinical lab tests: reviewed  Obtain history from someone other than the patient: yes    Risk of Complications, Morbidity, and/or Mortality  Presenting problems: low  Diagnostic procedures: low  Management options: low    Patient Progress  Patient progress: improved         Procedures

## 2022-11-13 ENCOUNTER — APPOINTMENT (OUTPATIENT)
Dept: CT IMAGING | Age: 37
End: 2022-11-13
Attending: STUDENT IN AN ORGANIZED HEALTH CARE EDUCATION/TRAINING PROGRAM
Payer: COMMERCIAL

## 2022-11-13 ENCOUNTER — HOSPITAL ENCOUNTER (EMERGENCY)
Age: 37
Discharge: HOME | End: 2022-11-13
Attending: STUDENT IN AN ORGANIZED HEALTH CARE EDUCATION/TRAINING PROGRAM
Payer: COMMERCIAL

## 2022-11-13 VITALS
DIASTOLIC BLOOD PRESSURE: 61 MMHG | HEART RATE: 84 BPM | RESPIRATION RATE: 18 BRPM | WEIGHT: 252.65 LBS | BODY MASS INDEX: 40.6 KG/M2 | SYSTOLIC BLOOD PRESSURE: 141 MMHG | HEIGHT: 66 IN | TEMPERATURE: 98.5 F | OXYGEN SATURATION: 99 %

## 2022-11-13 DIAGNOSIS — M62.830 SPASM OF THORACIC BACK MUSCLE: Primary | ICD-10-CM

## 2022-11-13 LAB
CA-I BLD-MCNC: 1.16 MMOL/L (ref 1.12–1.32)
CHLORIDE BLD-SCNC: 106 MMOL/L (ref 98–107)
CREAT BLD-MCNC: 0.41 MG/DL (ref 0.6–1.3)
GLUCOSE BLD-MCNC: 92 MG/DL (ref 65–100)
HCG UR QL: NEGATIVE
POTASSIUM BLD-SCNC: 3.6 MMOL/L (ref 3.5–5.1)
SERVICE CMNT-IMP: ABNORMAL
SODIUM BLD-SCNC: 142 MMOL/L (ref 136–145)

## 2022-11-13 PROCEDURE — 99284 EMERGENCY DEPT VISIT MOD MDM: CPT

## 2022-11-13 PROCEDURE — 71275 CT ANGIOGRAPHY CHEST: CPT

## 2022-11-13 PROCEDURE — 81025 URINE PREGNANCY TEST: CPT

## 2022-11-13 PROCEDURE — 80047 BASIC METABLC PNL IONIZED CA: CPT

## 2022-11-13 PROCEDURE — 74011000636 HC RX REV CODE- 636: Performed by: STUDENT IN AN ORGANIZED HEALTH CARE EDUCATION/TRAINING PROGRAM

## 2022-11-13 PROCEDURE — 74176 CT ABD & PELVIS W/O CONTRAST: CPT

## 2022-11-13 RX ORDER — FERROUS SULFATE, DRIED 160(50) MG
1 TABLET, EXTENDED RELEASE ORAL 2 TIMES DAILY WITH MEALS
COMMUNITY

## 2022-11-13 RX ORDER — DOCUSATE SODIUM 100 MG/1
100 CAPSULE, LIQUID FILLED ORAL 2 TIMES DAILY
COMMUNITY

## 2022-11-13 RX ORDER — GLUCOSAM/CHONDRO/HERB 149/HYAL 750-100 MG
1 TABLET ORAL DAILY
COMMUNITY

## 2022-11-13 NOTE — ED TRIAGE NOTES
Pt ambulates to treatment area she states that on Friday at 7 pm she began with some right flank/back pain mid way on her back. She went to [place] [facility] and was given some Valium and then a prescription for Flexeril. Since then she has noticed she is also constipated. Her back pain continues so she is here for an xray.   She denies any injury or fall

## 2022-11-13 NOTE — ED NOTES
The patient was discharged home by Dr Sienna Vu  in stable condition. The patient is alert and oriented, in no respiratory distress and discharge vital signs obtained. The patient's diagnosis, condition and treatment were explained. The patient expressed understanding. No prescriptions given/e-scribed to pharmacy. No work/school note given. A discharge plan has been developed. A  was not involved in the process. Aftercare instructions were given. Pt ambulatory out of the ED.

## 2022-11-13 NOTE — ED PROVIDER NOTES
68-year-old woman with history of asthma bronchitis presenting with pleuritic right flank pain. Is ongoing for several days, she seen yesterday at [place] emergency department and discharged with symptomatic treatments although she is concerned that maybe she has a kidney stone or another etiology. She has not been breathless but notes the pain is worse with deep breathing and movement. She has no history of blood clots in the past.  She also denies lower extremity edema. Past Medical History:   Diagnosis Date    Abnormal Papanicolaou smear of cervix 2004    Asthma     has not used inhaler years    Bronchitis     DIONI III (cervical intraepithelial neoplasia grade III) with severe dysplasia     Cold sore     history of oral cold sores    Encounter for IUD insertion 17 Mirena    Pap smear for cervical cancer screening 17 neg HPV NEG       Past Surgical History:   Procedure Laterality Date    HX GYN       HX LEEP PROCEDURE   DIONI III         Family History:   Problem Relation Age of Onset    No Known Problems Mother        Social History     Socioeconomic History    Marital status:      Spouse name: Not on file    Number of children: Not on file    Years of education: Not on file    Highest education level: Not on file   Occupational History    Not on file   Tobacco Use    Smoking status: Never    Smokeless tobacco: Never   Substance and Sexual Activity    Alcohol use: Yes     Comment: Social    Drug use: No    Sexual activity: Yes     Partners: Male     Birth control/protection: I.U.D.    Other Topics Concern    Not on file   Social History Narrative    Not on file     Social Determinants of Health     Financial Resource Strain: Not on file   Food Insecurity: Not on file   Transportation Needs: Not on file   Physical Activity: Not on file   Stress: Not on file   Social Connections: Not on file   Intimate Partner Violence: Not on file   Housing Stability: Not on file ALLERGIES: Aspirin    Review of Systems   Constitutional:  Negative for chills and fever. Eyes:  Negative for photophobia. Respiratory:  Positive for shortness of breath. Negative for cough and wheezing. Cardiovascular:  Negative for chest pain. Gastrointestinal:  Negative for abdominal pain, nausea and vomiting. Genitourinary:  Positive for flank pain. Negative for dysuria. Musculoskeletal:  Negative for back pain. Neurological:  Negative for headaches. Psychiatric/Behavioral:  Negative for confusion. All other systems reviewed and are negative. Vitals:    11/13/22 1444   BP: (!) 141/61   Pulse: 84   Resp: 18   Temp: 98.5 °F (36.9 °C)   SpO2: 99%   Weight: 114.6 kg (252 lb 10.4 oz)   Height: 5' 6\" (1.676 m)            Physical Exam  Constitutional:       General: She is not in acute distress. Appearance: She is not toxic-appearing. HENT:      Head: Normocephalic and atraumatic. Mouth/Throat:      Mouth: Mucous membranes are moist.   Eyes:      Extraocular Movements: Extraocular movements intact. Cardiovascular:      Rate and Rhythm: Normal rate and regular rhythm. Heart sounds: Normal heart sounds. Pulmonary:      Effort: Pulmonary effort is normal. No respiratory distress. Breath sounds: Normal breath sounds. Abdominal:      Palpations: Abdomen is soft. Tenderness: There is no abdominal tenderness. Musculoskeletal:      Cervical back: Normal range of motion. Right lower leg: No edema. Left lower leg: No edema. Comments: Tender over the right posterior thorax near the costosternal junction. Skin:     Capillary Refill: Capillary refill takes less than 2 seconds. Neurological:      General: No focal deficit present. Mental Status: She is alert and oriented to person, place, and time. Psychiatric:         Mood and Affect: Mood normal.        MDM    Patient with pleuritic right flank pain.   We will check a CT of the abdomen to evaluate for renal colic, incidental right pleural effusion and compressive atelectasis of identified, will rule out a PE with associated diffusion with CT angiogram.4:34 PM       CTA was negative. The small pleural effusion may be related to splinting or resolving pulmonary infection. Labs are otherwise unremarkable. PROGRESS NOTE:  6:01 PM  The patient has been re-evaluated and are stable for discharge. All available radiology and laboratory results have been reviewed with patient and/or available family. Patient and/or family verbally conveyed their understanding and agreement of the patient's signs, symptoms, diagnosis, treatment and prognosis and additionally agree to follow-up as recommended in the discharge instructions or to return to the Emergency Department should their condition change or worsen prior to their follow-up appointment. All questions have been answered and patient and/or available family who express understanding. LABORATORY RESULTS:  Labs Reviewed   POC CHEM8 - Abnormal; Notable for the following components:       Result Value    Creatinine (POC) 0.41 (*)     All other components within normal limits   HCG URINE, QL. - POC   POC CHEM8       IMAGING RESULTS:  CTA CHEST W OR W WO CONT   Final Result   Small right pleural effusion with underlying atelectasis. No   evidence of pulmonary embolism. CT ABD PELV WO CONT   Final Result   1. No renal calculi or evidence of obstructive uropathy. 2. Small right pleural effusion and minimal compressive atelectasis.               MEDICATIONS GIVEN:  Medications   iopamidoL (ISOVUE-370) 76 % injection 100 mL (has no administration in time range)       IMPRESSION:  1. Spasm of thoracic back muscle        PLAN:  Follow-up Information       Follow up With Specialties Details Why Contact Info    Erica Sibley MD Internal Medicine Physician   [address]  [address] [address] [phone] Discharge Medication List as of 11/13/2022  5:38 PM            Abdoul Cruz MD      Please note that this dictation was completed with [employer], the computer voice recognition software. Quite often unanticipated grammatical, syntax, homophones, and other interpretive errors are inadvertently transcribed by the computer software. Please disregard these errors. Please excuse any errors that have escaped final proofreading.          Procedures

## 2022-11-14 LAB — HCG UR QL: NEGATIVE

## 2022-12-30 ENCOUNTER — HOSPITAL ENCOUNTER (EMERGENCY)
Age: 37
Discharge: LEFT BEFORE DISCHARGE | End: 2022-12-30
Attending: STUDENT IN AN ORGANIZED HEALTH CARE EDUCATION/TRAINING PROGRAM
Payer: COMMERCIAL

## 2022-12-30 VITALS
SYSTOLIC BLOOD PRESSURE: 114 MMHG | TEMPERATURE: 98.5 F | HEART RATE: 70 BPM | BODY MASS INDEX: 40.11 KG/M2 | RESPIRATION RATE: 16 BRPM | HEIGHT: 65 IN | WEIGHT: 240.74 LBS | DIASTOLIC BLOOD PRESSURE: 72 MMHG | OXYGEN SATURATION: 100 %

## 2022-12-30 PROCEDURE — 75810000275 HC EMERGENCY DEPT VISIT NO LEVEL OF CARE

## 2022-12-30 NOTE — ED NOTES
Patient has not been seen by ED physician. Patient states she has to leave and will follow up with PCP.

## 2022-12-30 NOTE — ED TRIAGE NOTES
Pt ambulates to treatment area with steady gait she states that for the past 2 weeks she has has intermittent headaches that come on and last 3-5 seconds. Denies any N/V or dizziness with headaches. She has up to 5 episodes a day and had one earlier this morning that woke her briefly. She has had this type of headache  for over the past 5 months but in the past 2 weeks it has been carmita.

## 2023-01-03 ENCOUNTER — TELEPHONE (OUTPATIENT)
Dept: OBGYN CLINIC | Age: 38
End: 2023-01-03

## 2023-01-03 NOTE — TELEPHONE ENCOUNTER
42year old patient last seen in the office on 2022 for ae    Patient has iud placed on 2017     Patient calling to report for the past 2 weeks having cluster headaches that pop up out of no where and last 3-5 seconds , at least once a day and sometimes more    Patient is also feeling like her balance is off and gets dizzy when she turns her head to the right or left    Patient is wondering if her symptoms are related to her IUD soon to     This nurse advised of need to see evaluation at her PCP    Please advise    Thank you

## 2023-01-03 NOTE — TELEPHONE ENCOUNTER
Note likely related to IUD as the IUD is now good for 8 years. She should see her PCP or go to Urgent Care or ER today.

## 2023-01-28 NOTE — PROGRESS NOTES
Ramón Moreno is a Ramón Moreno is a 42 y.o. female presents for a problem visit. Chief Complaint   Patient presents with    IUD/Intrauterine Device     replacement     No LMP recorded. (Menstrual status: IUD). Birth Control: IUD. Last Pap: normal NO ECC obtained 06/2022    The patient is reporting having: headaches  She had planned to have her IUD replaced today. Her Mirena was placed 05/2017. 1. Have you been to the ER, urgent care clinic, or hospitalized since your last visit? No    2. Have you seen or consulted any other health care providers outside of the [address] [place] [address] since your last visit? No    Examination chaperoned by Tamika Rg CMA.

## 2023-01-30 ENCOUNTER — OFFICE VISIT (OUTPATIENT)
Dept: OBGYN CLINIC | Age: 38
End: 2023-01-30
Payer: COMMERCIAL

## 2023-01-30 DIAGNOSIS — Z30.433 ENCOUNTER FOR REMOVAL AND REINSERTION OF INTRAUTERINE CONTRACEPTIVE DEVICE (IUD): Primary | ICD-10-CM

## 2023-01-30 DIAGNOSIS — Z01.419 WELL WOMAN EXAM WITH ROUTINE GYNECOLOGICAL EXAM: ICD-10-CM

## 2023-01-30 PROBLEM — Z34.90 PREGNANCY: Status: RESOLVED | Noted: 2017-03-14 | Resolved: 2023-01-30

## 2023-01-30 PROCEDURE — 99395 PREV VISIT EST AGE 18-39: CPT | Performed by: OBSTETRICS & GYNECOLOGY

## 2023-01-30 NOTE — PROGRESS NOTES
Annual exam  Jewels Oseguera is a [id number],  42 y.o. female   No LMP recorded. (Menstrual status: IUD). She presents for her annual checkup. She is having no problems. She initially presented for replacement of IUD due to headache, however her headaches have resolved. She chose not to have IUD replaced (as has only been in since ). Menstrual status:  Minimally present with IUD. Sexual history:    She  reports being sexually active and has had partner(s) who are male. She reports using the following method of birth control/protection: I.U.D..    Per Nursing Note:     No LMP recorded. (Menstrual status: IUD). Birth Control: IUD. Last Pap: normal NO ECC obtained 2022     The patient is reporting having: headaches  She had planned to have her IUD replaced today. Her Mirena was placed 2017. Past Medical History:   Diagnosis Date    Abnormal Papanicolaou smear of cervix 2004    Asthma     has not used inhaler years    Bronchitis     DIONI III (cervical intraepithelial neoplasia grade III) with severe dysplasia     Cold sore     history of oral cold sores    Encounter for IUD insertion 17 Mirena    Pap smear for cervical cancer screening 17 neg HPV NEG     Past Surgical History:   Procedure Laterality Date    HX GYN       HX LEEP PROCEDURE   DIONI III         Allergies: Aspirin     Tobacco History:  reports that she has never smoked. She has never used smokeless tobacco.  Alcohol Abuse:  reports current alcohol use. Drug Abuse:  reports no history of drug use.     Family Medical/Cancer History:   Family History   Problem Relation Age of Onset    No Known Problems Mother         Review of Systems - History obtained from the patient  Constitutional: negative for weight loss, fever, night sweats  HEENT: negative for hearing loss, earache, congestion, snoring, sorethroat  CV: negative for chest pain, palpitations, edema  Resp: negative for cough, shortness of breath, wheezing  GI: negative for change in bowel habits, abdominal pain, black or bloody stools  : negative for frequency, dysuria, hematuria, vaginal discharge  MSK: negative for back pain, joint pain, muscle pain  Breast: negative for breast lumps, nipple discharge, galactorrhea  Skin :negative for itching, rash, hives  Neuro: negative for dizziness, headache, confusion, weakness  Psych: negative for anxiety, depression, change in mood  Heme/lymph: negative for bleeding, bruising, pallor    Physical Exam    There were no vitals taken for this visit.     Constitutional  Appearance: well-nourished, well developed, alert, in no acute distress    HENT  Head and Face: appears normal    Neck  Inspection/Palpation: normal appearance, no masses or tenderness  Lymph Nodes: no lymphadenopathy present  Thyroid: gland size normal, nontender, no nodules or masses present on palpation    Chest  Respiratory Effort: breathing unlabored    Breasts  Inspection of Breasts: breasts symmetrical, no skin changes, no discharge present, nipple appearance normal, no skin retraction present  Palpation of Breasts and Axillae: no masses present on palpation, no breast tenderness  Axillary Lymph Nodes: no lymphadenopathy present    Gastrointestinal  Abdominal Examination: abdomen non-tender to palpation, normal bowel sounds, no masses present  Liver and spleen: no hepatomegaly present, spleen not palpable  Hernias: no hernias identified    Genitourinary  External Genitalia: normal appearance for age, no discharge present, no tenderness present, no inflammatory lesions present, no masses present, no atrophy present  Vagina: normal vaginal vault without central or paravaginal defects, no discharge present, no inflammatory lesions present, no masses present  Bladder: non-tender to palpation  Urethra: appears normal  Cervix: normal, IUD strings appropriate length   Uterus: normal size, shape and consistency  Adnexa: no adnexal tenderness present, no adnexal masses present  Perineum: perineum within normal limits, no evidence of trauma, no rashes or skin lesions present  Anus: anus within normal limits, no hemorrhoids present  Inguinal Lymph Nodes: no lymphadenopathy present    Skin  General Inspection: no rash, no lesions identified    Neurologic/Psychiatric  Mental Status:  Orientation: grossly oriented to person, place and time  Mood and Affect: mood normal, affect appropriate    Assessment:  Routine gynecologic examination  Her current medical status is satisfactory with no evidence of significant gynecologic issues.     Plan:  Counseled re: diet, exercise, healthy lifestyle  Return for yearly wellness visits  Pt counseled regarding co-testing for high risk HPV with pap  Rec screening mammo at either 33 or 38

## 2023-02-03 LAB
CYTOLOGIST CVX/VAG CYTO: NORMAL
CYTOLOGY CVX/VAG DOC CYTO: NORMAL
CYTOLOGY CVX/VAG DOC THIN PREP: NORMAL
CYTOLOGY HISTORY:: NORMAL
DX ICD CODE: NORMAL
HPV I/H RISK 4 DNA CVX QL PROBE+SIG AMP: NEGATIVE
Lab: NORMAL
Lab: NORMAL
OTHER STN SPEC: NORMAL
STAT OF ADQ CVX/VAG CYTO-IMP: NORMAL

## 2023-08-08 ENCOUNTER — TELEPHONE (OUTPATIENT)
Age: 38
End: 2023-08-08

## 2023-08-08 RX ORDER — FLUCONAZOLE 150 MG/1
150 TABLET ORAL DAILY
Qty: 3 TABLET | Refills: 0 | Status: SHIPPED | OUTPATIENT
Start: 2023-08-08 | End: 2023-08-11

## 2023-08-08 NOTE — TELEPHONE ENCOUNTER
Two patient identifiers used    34year old patient seen on 1/30/2023 for iud removal and reinsertion    Patient calling to reports yeast infection symptoms of itching for the past three days and reports probiotics are not helping   Patient is wondering if a prescription can be sent in to her confirmed pharamcy      Please advise    ?  Ov    Thank you

## 2023-08-08 NOTE — TELEPHONE ENCOUNTER
Patient advised of MD recommendations and prescription sent as per MD verbal orders to patient confirmed pharmacy    Patient verbalized understanding.

## 2024-01-12 ENCOUNTER — TELEPHONE (OUTPATIENT)
Age: 39
End: 2024-01-12

## 2024-01-12 NOTE — TELEPHONE ENCOUNTER
PT name and  verified    37 yo last ov 23    PT calling stating she wants STD testing and asap. PT states her  had \"a cut\" on his penis, and is + for syphilis and wants to be tested for all STD's. PT scheduled for 1/15/24 at 0920. PT will need an ae scheduled after this problem/STD screen visit. PT denies any symptoms at this time.  PT verbalizes understanding on appt and time.

## 2024-01-14 NOTE — PROGRESS NOTES
Bindu Yen is a 38 y.o. female presents for a problem visit.    Chief Complaint   Patient presents with    Exposure to STD     No LMP recorded. (Menstrual status: IUD).  Birth Control: IUD.  Last Pap: normal obtained 1 year(s) ago.    The patient is reporting having: STD testing.  Pt reports her  tested positive for syphilis.         1. Have you been to the ER, urgent care clinic, or hospitalized since your last visit? Yes    2. Have you seen or consulted any other health care providers outside of the [facility] since your last visit? Yes    Examination chaperoned by Wilfrid Rehman CMA.

## 2024-01-15 ENCOUNTER — OFFICE VISIT (OUTPATIENT)
Age: 39
End: 2024-01-15
Payer: COMMERCIAL

## 2024-01-15 DIAGNOSIS — Z20.2 EXPOSURE TO SEXUALLY TRANSMITTED DISEASE (STD): Primary | ICD-10-CM

## 2024-01-15 PROCEDURE — 99213 OFFICE O/P EST LOW 20 MIN: CPT | Performed by: OBSTETRICS & GYNECOLOGY

## 2024-01-15 NOTE — PROGRESS NOTES
GYN Problem Visit Note    Chief Complaint   Exposure to STD   No LMP recorded. (Menstrual status: IUD)..      GEORGE Yen is a 38 y.o. female who complains of   Chief Complaint   Patient presents with    Exposure to STD      recently tested positive for Syphilis.  No symptoms, requests treatment    Per Nursing Note:  No LMP recorded. (Menstrual status: IUD).  Birth Control: IUD.  Last Pap: normal obtained 1 year(s) ago.     The patient is reporting having: STD testing.  Pt reports her  tested positive for syphilis.     Past Medical History:   Diagnosis Date    Abnormal Papanicolaou smear of cervix 2004    Asthma     has not used inhaler years    Bronchitis     DENNIS III (cervical intraepithelial neoplasia grade III) with severe dysplasia     Cold sore     history of oral cold sores    Encounter for IUD insertion 17 Mirena    Pap smear for cervical cancer screening 17 neg HPV NEG     Past Surgical History:   Procedure Laterality Date    GYN       LEEP   DENNIS III     Social History     Occupational History    Not on file   Tobacco Use    Smoking status: Never    Smokeless tobacco: Never   Substance and Sexual Activity    Alcohol use: Yes     Alcohol/week: 2.0 standard drinks of alcohol     Types: 2 Drinks containing 0.5 oz of alcohol per week    Drug use: No    Sexual activity: Yes     Partners: Male     Birth control/protection: I.U.D.     Family History   Problem Relation Age of Onset    No Known Problems Mother        Allergies   Allergen Reactions    Aspirin Hives     Prior to Admission medications    Not on File        Review of Systems: History obtained from the patient  Constitutional: negative for weight loss, fever, night sweats  Breast: negative for breast lumps, nipple discharge, galactorrhea  GI: negative for change in bowel habits, abdominal pain, black or bloody stools  : negative for frequency, dysuria, hematuria, vaginal discharge  MSK: negative for

## 2024-01-16 LAB
HBV SURFACE AG SER QL: 0.58 INDEX
HBV SURFACE AG SER QL: NEGATIVE
HCV AB SER IA-ACNC: 0.18 INDEX
HCV AB SERPL QL IA: NONREACTIVE
HIV 1+2 AB+HIV1 P24 AG SERPL QL IA: NONREACTIVE
HIV 1/2 RESULT COMMENT: NORMAL

## 2024-01-17 LAB — T PALLIDUM AB SER QL IA: NON REACTIVE

## 2024-01-18 LAB
C TRACH RRNA SPEC QL NAA+PROBE: NEGATIVE
N GONORRHOEA RRNA SPEC QL NAA+PROBE: NEGATIVE
T VAGINALIS RRNA SPEC QL NAA+PROBE: NEGATIVE

## 2024-06-14 RX ORDER — FLUCONAZOLE 150 MG/1
150 TABLET ORAL DAILY
Qty: 3 TABLET | Refills: 0 | Status: SHIPPED | OUTPATIENT
Start: 2024-06-14 | End: 2024-06-17

## 2024-06-14 RX ORDER — NYSTATIN AND TRIAMCINOLONE ACETONIDE 100000; 1 [USP'U]/G; MG/G
OINTMENT TOPICAL
Qty: 30 G | Refills: 0 | Status: SHIPPED | OUTPATIENT
Start: 2024-06-14

## 2024-11-21 NOTE — PROGRESS NOTES
Bindu Yen is a 39 y.o. female returns for an annual exam     Chief Complaint   Patient presents with    Annual Exam       No LMP recorded. (Menstrual status: IUD).  Her periods are absent.   Problems: walls of rectum itches at times. Uses mycolog cream PRN  Birth Control: IUD.  Last Pap: normal, no ECC, obtained 1 year(s) ago on 1/30/23.  She does have a history of DENNIS 2, 3 or cervical cancer.   Last Mammogram: had it today in the office.   Last Bone Density: obtained.   Last colonoscopy: not obtained.       Examination chaperoned by Sia Almanza MA.

## 2024-11-22 ENCOUNTER — OFFICE VISIT (OUTPATIENT)
Age: 39
End: 2024-11-22
Payer: COMMERCIAL

## 2024-11-22 VITALS
SYSTOLIC BLOOD PRESSURE: 105 MMHG | BODY MASS INDEX: 44.26 KG/M2 | HEART RATE: 81 BPM | WEIGHT: 266 LBS | DIASTOLIC BLOOD PRESSURE: 68 MMHG

## 2024-11-22 DIAGNOSIS — Z01.419 ENCOUNTER FOR GYNECOLOGICAL EXAMINATION WITHOUT ABNORMAL FINDING: Primary | ICD-10-CM

## 2024-11-22 DIAGNOSIS — Z12.4 CERVICAL CANCER SCREENING: ICD-10-CM

## 2024-11-22 PROCEDURE — 99459 PELVIC EXAMINATION: CPT | Performed by: OBSTETRICS & GYNECOLOGY

## 2024-11-22 PROCEDURE — 99396 PREV VISIT EST AGE 40-64: CPT | Performed by: OBSTETRICS & GYNECOLOGY

## 2024-11-22 RX ORDER — NYSTATIN AND TRIAMCINOLONE ACETONIDE 100000; 1 [USP'U]/G; MG/G
OINTMENT TOPICAL
Qty: 30 G | Refills: 0 | Status: SHIPPED | OUTPATIENT
Start: 2024-11-22

## 2024-11-22 RX ORDER — TIRZEPATIDE 2.5 MG/.5ML
INJECTION, SOLUTION SUBCUTANEOUS
COMMUNITY
Start: 2024-10-25

## 2024-11-22 NOTE — PROGRESS NOTES
Annual exam  Bindu Yen is a No obstetric history on file.,  39 y.o. female   No LMP recorded. (Menstrual status: IUD).    She presents for her annual checkup.     Sexual history:    She  reports being sexually active and has had partner(s) who are male. She reports using the following method of birth control/protection: I.U.D..    Per Nursing Note:  No LMP recorded. (Menstrual status: IUD).  Her periods are absent.   Problems: walls of rectum itches at times. Uses mycolog cream PRN  Birth Control: IUD.  Last Pap: normal, no ECC, obtained 1 year(s) ago on 23.  She does have a history of DENNIS 2, 3 or cervical cancer.   Last Mammogram: had it today in the office.   Last Bone Density: obtained.   Last colonoscopy: not obtained    Past Medical History:   Diagnosis Date    Abnormal Papanicolaou smear of cervix 2004    Asthma     has not used inhaler years    Bronchitis     DENNIS III (cervical intraepithelial neoplasia grade III) with severe dysplasia     Cold sore     history of oral cold sores    Encounter for IUD insertion 17 Mirena    Pap smear for cervical cancer screening 17 neg HPV NEG     Past Surgical History:   Procedure Laterality Date    GYN       LEEP   DENNIS III       Current Outpatient Medications   Medication Sig Dispense Refill    ZEPBOUND 2.5 MG/0.5ML SOAJ subCUTAneous auto-injector pen 0.5 mL Subcutaneous q week for 30 days      nystatin-triamcinolone (MYCOLOG) 250835-5.1 UNIT/GM-% ointment Apply to affected area bid x 1 week 30 g 0     No current facility-administered medications for this visit.     Allergies: Aspirin     Tobacco History:  reports that she has never smoked. She has never used smokeless tobacco.  Alcohol Abuse:  reports current alcohol use of about 2.0 standard drinks of alcohol per week.  Drug Abuse:  reports no history of drug use.    Family Medical/Cancer History:   Family History   Problem Relation Age of Onset    No Known Problems Mother

## 2024-11-26 RX ORDER — FLUCONAZOLE 150 MG/1
150 TABLET ORAL DAILY
Qty: 3 TABLET | Refills: 0 | OUTPATIENT
Start: 2024-11-26 | End: 2024-11-29

## 2024-11-28 LAB
CYTOLOGIST CVX/VAG CYTO: NORMAL
CYTOLOGY CVX/VAG DOC CYTO: NORMAL
CYTOLOGY CVX/VAG DOC THIN PREP: NORMAL
DX ICD CODE: NORMAL
HPV GENOTYPE REFLEX: NORMAL
HPV I/H RISK 4 DNA CVX QL PROBE+SIG AMP: NEGATIVE
Lab: NORMAL
OTHER STN SPEC: NORMAL
STAT OF ADQ CVX/VAG CYTO-IMP: NORMAL

## 2024-12-12 DIAGNOSIS — Z30.431 IUD CHECK UP: Primary | ICD-10-CM

## 2025-01-22 ENCOUNTER — OFFICE VISIT (OUTPATIENT)
Age: 40
End: 2025-01-22
Payer: COMMERCIAL

## 2025-01-22 VITALS
WEIGHT: 250 LBS | HEART RATE: 75 BPM | DIASTOLIC BLOOD PRESSURE: 60 MMHG | BODY MASS INDEX: 41.6 KG/M2 | SYSTOLIC BLOOD PRESSURE: 109 MMHG

## 2025-01-22 DIAGNOSIS — Z30.431 IUD CHECK UP: Primary | ICD-10-CM

## 2025-01-22 DIAGNOSIS — Z30.433 ENCOUNTER FOR IUD REMOVAL AND REINSERTION: ICD-10-CM

## 2025-01-22 DIAGNOSIS — D21.9 FIBROIDS: ICD-10-CM

## 2025-01-22 PROCEDURE — 99212 OFFICE O/P EST SF 10 MIN: CPT | Performed by: OBSTETRICS & GYNECOLOGY

## 2025-01-22 PROCEDURE — 58301 REMOVE INTRAUTERINE DEVICE: CPT | Performed by: OBSTETRICS & GYNECOLOGY

## 2025-01-22 PROCEDURE — 58300 INSERT INTRAUTERINE DEVICE: CPT | Performed by: OBSTETRICS & GYNECOLOGY

## 2025-01-22 NOTE — PROGRESS NOTES
Bindu Yen is a 39 y.o. female presents for a problem visit.    Chief Complaint   Patient presents with    IUD check      No LMP recorded. (Menstrual status: IUD).    The patient is here for an IUD check as the strings could not be located on previous exam.   No concerns for patient.   Imaging performed, see report.      Device number [id number], expiration date 2/28/27    Chart reviewed for the following:   Sia LAZARO MA, have reviewed the History, Physical and updated the Allergic reactions for Bindu Yen     TIME OUT performed immediately prior to start of procedure:   Sia LAZARO MA, have performed the following reviews on Bindu Yen prior to the start of the procedure:            * Patient was identified by name and date of birth   * Agreement on procedure being performed was verified  * Risks and Benefits explained to the patient  * Procedure site verified and marked as necessary  * Patient was positioned for comfort  * Consent was signed and verified     Time: 1022    Date of procedure: 1/22/2025    Procedure performed by:  Elsie Sylvester MD       Provider assisted by: Sia Almanza MA    Patient assisted by: self    How tolerated by patient: tolerated the procedure well with no complications    Post Procedural Pain Scale: 2 - Hurts Little Bit    Comments: none    Examination chaperoned by Sia Almanza MA.      .  
file   Tobacco Use    Smoking status: Never    Smokeless tobacco: Never   Substance and Sexual Activity    Alcohol use: Yes     Alcohol/week: 2.0 standard drinks of alcohol     Types: 2 Drinks containing 0.5 oz of alcohol per week    Drug use: No    Sexual activity: Yes     Partners: Male     Birth control/protection: I.U.D.     Family History   Problem Relation Age of Onset    No Known Problems Mother        Allergies   Allergen Reactions    Aspirin Hives     Prior to Admission medications    Medication Sig Start Date End Date Taking? Authorizing Provider   ZEPBOUND 2.5 MG/0.5ML SOAJ subCUTAneous auto-injector pen 0.5 mL Subcutaneous q week for 30 days 10/25/24   Provider, MD Sharifa   nystatin-triamcinolone (MYCOLOG) 507223-2.1 UNIT/GM-% ointment Apply to affected area bid x 1 week 11/22/24   Elsie Sylvester MD        Review of Systems: History obtained from the patient  Constitutional: negative for weight loss, fever, night sweats  Breast: negative for breast lumps, nipple discharge, galactorrhea  GI: negative for change in bowel habits, abdominal pain, black or bloody stools  : negative for frequency, dysuria, hematuria, vaginal discharge  MSK: negative for back pain, joint pain, muscle pain  Skin: negative for itching, rash, hives  Psych: negative for anxiety, depression, change in mood      Objective:  /60   Pulse 75   Wt 113.4 kg (250 lb)   BMI 41.60 kg/m²     Physical Exam:   PHYSICAL EXAMINATION    Constitutional  Appearance: well-nourished, well developed, alert, in no acute distress    Skin  General Inspection: no rash, no lesions identified    Neurologic/Psychiatric  Mental Status:  Orientation: grossly oriented to person, place and time  Mood and Affect: mood normal, affect appropriate      ASSESSMENT/PLAN:  IUD heck- IUD in proper position  Fibroids- asymptomatic at this time, will observe.    RTO prn if symptoms persist or worsen.  Instructions given to pt.  Handouts given to

## 2025-02-20 NOTE — PROGRESS NOTES
Bindu Yen is a 39 y.o. female presents for a problem visit.    No chief complaint on file.    No LMP recorded. (Menstrual status: IUD).  Birth Control: IUD.  Last Pap: normal obtained 11/20/24    The patient is here today to have her IUD checked.        Examination chaperoned by JER MILLER RN.

## 2025-02-21 ENCOUNTER — OFFICE VISIT (OUTPATIENT)
Age: 40
End: 2025-02-21

## 2025-02-21 VITALS
WEIGHT: 249 LBS | DIASTOLIC BLOOD PRESSURE: 76 MMHG | SYSTOLIC BLOOD PRESSURE: 117 MMHG | BODY MASS INDEX: 41.44 KG/M2 | HEART RATE: 76 BPM

## 2025-02-21 DIAGNOSIS — Z30.431 IUD CHECK UP: Primary | ICD-10-CM

## 2025-02-21 NOTE — PROGRESS NOTES
GYN Problem Visit Note    Chief Complaint   No chief complaint on file.   No LMP recorded. (Menstrual status: IUD)..      GEORGE Yen is a 39 y.o. female who complains of No chief complaint on file.      She reports no concerns.  She presents for an IUD check.     Per Nursing Note:  No chief complaint on file.     No LMP recorded. (Menstrual status: IUD).  Birth Control: IUD.  Last Pap: normal obtained 24     The patient is here today to have her IUD checked.    Past Medical History:   Diagnosis Date    Abnormal Papanicolaou smear of cervix 2004    Asthma     has not used inhaler years    Bronchitis     DENNIS III (cervical intraepithelial neoplasia grade III) with severe dysplasia     Cold sore     history of oral cold sores    Encounter for IUD insertion 17 Mirena    Pap smear for cervical cancer screening 17 neg HPV NEG     Past Surgical History:   Procedure Laterality Date    GYN       LEEP   DENNIS III     Social History     Occupational History    Not on file   Tobacco Use    Smoking status: Never    Smokeless tobacco: Never   Substance and Sexual Activity    Alcohol use: Yes     Alcohol/week: 2.0 standard drinks of alcohol     Types: 2 Drinks containing 0.5 oz of alcohol per week    Drug use: No    Sexual activity: Yes     Partners: Male     Birth control/protection: I.U.D.     Family History   Problem Relation Age of Onset    No Known Problems Mother        Allergies   Allergen Reactions    Aspirin Hives     Prior to Admission medications    Medication Sig Start Date End Date Taking? Authorizing Provider   ZEPBOUND 2.5 MG/0.5ML SOAJ subCUTAneous auto-injector pen 0.5 mL Subcutaneous q week for 30 days 10/25/24   Provider, MD Sharifa   nystatin-triamcinolone (MYCOLOG) 001343-4.1 UNIT/GM-% ointment Apply to affected area bid x 1 week 24   Elsie Sylvester MD        Review of Systems: History obtained from the patient  Constitutional: negative for weight loss,

## 2025-02-21 NOTE — PROGRESS NOTES
Bindu Yen is a 39 y.o. female presents for a problem visit.    Chief Complaint   Patient presents with    IUD check     No LMP recorded. (Menstrual status: IUD).  Birth Control: IUD.  Last Pap: normal obtained 11/20/24    The patient is here today to have her IUD checked.        Examination chaperoned by Dorothy Nam LPN.

## 2025-04-28 ENCOUNTER — TELEPHONE (OUTPATIENT)
Age: 40
End: 2025-04-28

## 2025-04-28 NOTE — TELEPHONE ENCOUNTER
PT was called back, no answer, left generic vm for PT to call the office back at her earliest convenience.      MD's message to relay:  Elsie Sylvester MD to Me      4/28/25 11:18 AM  Note     More likely related to intestines, may be constipated or have gas.  Can schedule an ultrasound and follow-up (not emergent, don't overbook).          Earliest ov/us is 6/18?

## 2025-04-28 NOTE — TELEPHONE ENCOUNTER
PT name and  verified    39 yo last ov 25, last ae 24, next ae 25    PT calling reporting some pelvic swelling, her pubic area, below her stomach, states \"one side is swollen than the other\".  PT states she noted it last week, and thought it would go away, but it has gotten slightly bigger. PT reports it being the R side, no pain, no bruising, nothing identifiable other than the swelling.  PT had her  look at it and he can visually see it is bigger.  PT was trying to make an appt with scheduling and was transferred.    Please advise/Ov?    Thank you

## 2025-04-28 NOTE — TELEPHONE ENCOUNTER
Elsie Sylvester MD Physician Signed 1:04 PM     Copy     She is welcome to schedule an appointment with her PCP to see if they can see her sooner.           Two patient identifies used    Patient calling back and was advised of MD recommendations.    Patient verbalized understanding.

## 2025-04-28 NOTE — TELEPHONE ENCOUNTER
PT calling back, name and  verified    RN relayed MD's message and relayed that  was the earliest ov with MD.  PT states she needs to be seen, \"this is not normal\", states it is not constipation she has been having bowel movements.    Please advise  Earliest ov is  with MD  Thank you

## 2025-04-28 NOTE — TELEPHONE ENCOUNTER
More likely related to intestines, may be constipated or have gas.  Can schedule an ultrasound and follow-up (not emergent, don't overbook).

## 2025-06-11 RX ORDER — FLUCONAZOLE 150 MG/1
150 TABLET ORAL DAILY
Qty: 3 TABLET | Refills: 0 | OUTPATIENT
Start: 2025-06-11 | End: 2025-06-14

## 2025-06-12 RX ORDER — FLUCONAZOLE 150 MG/1
150 TABLET ORAL DAILY
Qty: 3 TABLET | Refills: 0 | OUTPATIENT
Start: 2025-06-12 | End: 2025-06-15

## 2025-08-08 ENCOUNTER — OFFICE VISIT (OUTPATIENT)
Age: 40
End: 2025-08-08

## 2025-08-08 VITALS
OXYGEN SATURATION: 96 % | BODY MASS INDEX: 39.12 KG/M2 | WEIGHT: 243.4 LBS | HEIGHT: 66 IN | DIASTOLIC BLOOD PRESSURE: 84 MMHG | HEART RATE: 75 BPM | SYSTOLIC BLOOD PRESSURE: 124 MMHG

## 2025-08-08 DIAGNOSIS — N76.0 ACUTE VAGINITIS: ICD-10-CM

## 2025-08-08 DIAGNOSIS — N89.8 VAGINAL ITCHING: Primary | ICD-10-CM

## 2025-08-08 RX ORDER — FLUCONAZOLE 150 MG/1
150 TABLET ORAL DAILY
Qty: 3 TABLET | Refills: 0 | Status: SHIPPED | OUTPATIENT
Start: 2025-08-08 | End: 2025-08-11

## 2025-08-08 RX ORDER — NYSTATIN AND TRIAMCINOLONE ACETONIDE 100000; 1 [USP'U]/G; MG/G
OINTMENT TOPICAL
Qty: 30 G | Refills: 0 | Status: SHIPPED | OUTPATIENT
Start: 2025-08-08

## 2025-08-08 SDOH — ECONOMIC STABILITY: FOOD INSECURITY: WITHIN THE PAST 12 MONTHS, THE FOOD YOU BOUGHT JUST DIDN'T LAST AND YOU DIDN'T HAVE MONEY TO GET MORE.: NEVER TRUE

## 2025-08-08 SDOH — ECONOMIC STABILITY: FOOD INSECURITY: WITHIN THE PAST 12 MONTHS, YOU WORRIED THAT YOUR FOOD WOULD RUN OUT BEFORE YOU GOT MONEY TO BUY MORE.: NEVER TRUE

## 2025-08-08 ASSESSMENT — PATIENT HEALTH QUESTIONNAIRE - PHQ9
SUM OF ALL RESPONSES TO PHQ QUESTIONS 1-9: 0
2. FEELING DOWN, DEPRESSED OR HOPELESS: NOT AT ALL
1. LITTLE INTEREST OR PLEASURE IN DOING THINGS: NOT AT ALL

## 2025-08-10 ENCOUNTER — TELEPHONE (OUTPATIENT)
Age: 40
End: 2025-08-10

## 2025-08-10 LAB
A VAGINAE DNA VAG QL NAA+PROBE: ABNORMAL SCORE
BVAB2 DNA VAG QL NAA+PROBE: ABNORMAL SCORE
C ALBICANS DNA VAG QL NAA+PROBE: NEGATIVE
C GLABRATA DNA VAG QL NAA+PROBE: NEGATIVE
MEGA1 DNA VAG QL NAA+PROBE: ABNORMAL SCORE

## 2025-08-10 RX ORDER — METRONIDAZOLE 500 MG/1
500 TABLET ORAL 2 TIMES DAILY
Qty: 14 TABLET | Refills: 0 | Status: SHIPPED | OUTPATIENT
Start: 2025-08-10 | End: 2025-08-17

## (undated) DEVICE — MASTISOL ADHESIVE LIQ 2/3ML

## (undated) DEVICE — REM POLYHESIVE ADULT PATIENT RETURN ELECTRODE: Brand: VALLEYLAB

## (undated) DEVICE — TRAY CATH OD16FR SIL URIN M STATLOK STBL DEV SURSTP

## (undated) DEVICE — SOLUTION IV 1000ML 0.9% SOD CHL

## (undated) DEVICE — STERILE POLYISOPRENE POWDER-FREE SURGICAL GLOVES: Brand: PROTEXIS

## (undated) DEVICE — TIP CLEANER: Brand: VALLEYLAB

## (undated) DEVICE — BULB SYRINGE, IRRIGATION WITH PROTECTIVE CAP, 60 CC, INDIVIDUALLY WRAPPED: Brand: DOVER

## (undated) DEVICE — SUTURE VCRL SZ 0 L36IN ABSRB VLT L40MM CT 1/2 CIR J358H

## (undated) DEVICE — SUTURE MCRYL SZ 4-0 L27IN ABSRB UD L19MM PS-2 1/2 CIR PRIM Y426H

## (undated) DEVICE — BLADE ASSEMB CLP HAIR FINE --

## (undated) DEVICE — TOWEL,OR,DSP,ST,BLUE,STD,2/PK,40PK/CS: Brand: MEDLINE

## (undated) DEVICE — POOLE SUCTION INSTRUMENT WITH REMOVABLE SHEATH: Brand: POOLE

## (undated) DEVICE — (D)PREP SKN CHLRAPRP APPL 26ML -- CONVERT TO ITEM 371833

## (undated) DEVICE — KENDALL SCD EXPRESS SLEEVES, KNEE LENGTH, MEDIUM: Brand: KENDALL SCD

## (undated) DEVICE — (D)STRIP SKN CLSR 0.5X4IN WHT --

## (undated) DEVICE — SOLIDIFIER FLUID 3000 CC ABSORB

## (undated) DEVICE — ROCKER SWITCH PENCIL HOLSTER: Brand: VALLEYLAB

## (undated) DEVICE — 3000CC GUARDIAN II: Brand: GUARDIAN

## (undated) DEVICE — SUTURE MCRYL SZ 0 L36IN ABSRB UD L36MM CT-1 1/2 CIR Y946H

## (undated) DEVICE — C-SECTION II-LF: Brand: MEDLINE INDUSTRIES, INC.